# Patient Record
Sex: MALE | Race: WHITE | NOT HISPANIC OR LATINO | Employment: OTHER | URBAN - METROPOLITAN AREA
[De-identification: names, ages, dates, MRNs, and addresses within clinical notes are randomized per-mention and may not be internally consistent; named-entity substitution may affect disease eponyms.]

---

## 2020-03-05 ENCOUNTER — OFFICE VISIT (OUTPATIENT)
Dept: FAMILY MEDICINE | Facility: CLINIC | Age: 55
End: 2020-03-05
Payer: MEDICAID

## 2020-03-05 VITALS
BODY MASS INDEX: 24.34 KG/M2 | SYSTOLIC BLOOD PRESSURE: 138 MMHG | WEIGHT: 170 LBS | HEART RATE: 110 BPM | TEMPERATURE: 98 F | DIASTOLIC BLOOD PRESSURE: 98 MMHG | OXYGEN SATURATION: 97 % | HEIGHT: 70 IN

## 2020-03-05 DIAGNOSIS — Z00.00 WELLNESS EXAMINATION: ICD-10-CM

## 2020-03-05 DIAGNOSIS — I10 ESSENTIAL HYPERTENSION: Primary | ICD-10-CM

## 2020-03-05 DIAGNOSIS — Z72.0 TOBACCO ABUSE: ICD-10-CM

## 2020-03-05 DIAGNOSIS — Z23 NEED FOR TDAP VACCINATION: ICD-10-CM

## 2020-03-05 DIAGNOSIS — Z23 NEEDS FLU SHOT: ICD-10-CM

## 2020-03-05 DIAGNOSIS — Z86.39 HISTORY OF HYPERGLYCEMIA: ICD-10-CM

## 2020-03-05 PROCEDURE — 90472 IMMUNIZATION ADMIN EACH ADD: CPT | Mod: PBBFAC | Performed by: NURSE PRACTITIONER

## 2020-03-05 PROCEDURE — 99204 OFFICE O/P NEW MOD 45 MIN: CPT | Performed by: NURSE PRACTITIONER

## 2020-03-05 PROCEDURE — 90471 IMMUNIZATION ADMIN: CPT | Mod: PBBFAC | Performed by: NURSE PRACTITIONER

## 2020-03-05 PROCEDURE — 90715 TDAP VACCINE 7 YRS/> IM: CPT | Mod: PBBFAC | Performed by: NURSE PRACTITIONER

## 2020-03-05 PROCEDURE — 99204 PR OFFICE/OUTPT VISIT, NEW, LEVL IV, 45-59 MIN: ICD-10-PCS | Mod: S$PBB,,, | Performed by: NURSE PRACTITIONER

## 2020-03-05 PROCEDURE — 99204 OFFICE O/P NEW MOD 45 MIN: CPT | Mod: S$PBB,,, | Performed by: NURSE PRACTITIONER

## 2020-03-05 RX ORDER — LISINOPRIL 40 MG/1
40 TABLET ORAL DAILY
COMMUNITY
End: 2020-03-05 | Stop reason: SDUPTHER

## 2020-03-05 RX ORDER — LISINOPRIL 40 MG/1
40 TABLET ORAL DAILY
Qty: 90 TABLET | Refills: 1 | Status: SHIPPED | OUTPATIENT
Start: 2020-03-05 | End: 2020-08-13 | Stop reason: SDUPTHER

## 2020-03-05 NOTE — PATIENT INSTRUCTIONS
Eating Heart-Healthy Foods  Eating has a big impact on your heart health. In fact, eating healthier can improve several of your heart risks at once. For instance, it helps you manage weight, cholesterol, and blood pressure. Here are ideas to help you make heart-healthy changes without giving up all the foods and flavors you love.  Getting started  · Talk with your health care provider about eating plans, such as the DASH or Mediterranean diet. You may also be referred to a dietitian.  · Change a few things at a time. Give yourself time to get used to a few eating changes before adding more.  · Work to create a tasty, healthy eating plan that you can stick to for the rest of your life.    Goals for healthy eating  Below are some tips to improve your eating habits:  · Limit saturated fats and trans fats. Saturated fats raise your levels of cholesterol, so keep these fats to a minimum. They are found in foods such as fatty meats, whole milk, cheese, and palm and coconut oils. Avoid trans fats because they lower good cholesterol as well as raise bad cholesterol. Trans fats are most often found in processed foods.  · Reduce sodium (salt) intake. Eating too much salt may increase your blood pressure. Limit your sodium intake to 2,300 milligrams (mg) per day, or less if your health care provider recommends it. Dining out less often and eating fewer processed foods are two great ways to decrease the amount of salt you consume.  · Managing calories. A calorie is a unit of energy. Your body burns calories for fuel, but if you eat more calories than your body burns, the extras are stored as fat. Your health care provider can help you create a diet plan to manage your calories. This will likely include eating healthier foods as well as exercising regularly. To help you track your progress, keep a diary to record what you eat and how often you exercise.  Choose the right foods  Aim to make these foods staples of your diet. If  you have diabetes, you may have different recommendations than what is listed here:  · Fruits and vegetable provide plenty of nutrients without a lot of calories. At meals, fill half your plate with these foods. Split the other half of your plate between whole grains and lean protein.  · Whole grains are high in fiber and rich in vitamins and nutrients. Good choices include whole-wheat bread, pasta, and brown rice.  · Lean proteins give you nutrition with less fat. Good choices include fish, skinless chicken, and beans.  · Low-fat or nonfat dairy provides nutrients without a lot of fat. Try low-fat or nonfat milk, cheese, or yogurt.  · Healthy fats can be good for you in small amounts. These are unsaturated fats, such as olive oil, nuts, and fish. Try to have at least 2 servings per week of fatty fish such as salmon, sardines, mackerel, rainbow trout, and albacore tuna. These contain omega-3 fatty acids, which are good for your heart. Flaxseed is another source of a heart-healthy fat.  More on heart healthy eating    Read food labels  Healthy eating starts at the grocery store. Be sure to pay attention to food labels on packaged foods. Look for products that are high in fiber and protein, and low in saturated fat, cholesterol, and sodium. Avoid products that contain trans fat. And pay close attention to serving size. For instance, if you plan to eat two servings, double all the numbers on the label.  Prepare food right  A key part of healthy cooking is cutting down on added fat and salt. Look on the internet for lower-fat, lower-sodium recipes. Also, try these tips:  · Remove fat from meat and skin from poultry before cooking.  · Skim fat from the surface of soups and sauces.  · Broil, boil, bake, steam, grill, and microwave food without added fats.  · Choose ingredients that spice up your food without adding calories, fat, or sodium. Try these items: horseradish, hot sauce, lemon, mustard, nonfat salad dressings,  and vinegar. For salt-free herbs and spices, try basil, cilantro, cinnamon, pepper, and rosemary.  Date Last Reviewed: 6/25/2015 © 2000-2017 [employer]. [address] [place] [address], [place] [address]. All rights reserved. This information is not intended as a substitute for professional medical care. Always follow your healthcare professional's instructions.        Established High Blood Pressure    High blood pressure (hypertension) is a chronic disease. Often, healthcare providers dont know what causes it. But it can be caused by certain health conditions and medicines.  If you have high blood pressure, you may not have any symptoms. If you do have symptoms, they may include headache, dizziness, changes in your vision, chest pain, and shortness of breath. But even without symptoms, high blood pressure thats not treated raises your risk for heart attack and stroke. High blood pressure is a serious health risk and shouldnt be ignored.  A blood pressure reading is made up of two numbers: a higher number over a lower number. The top number is the systolic pressure. The bottom number is the diastolic pressure. A normal blood pressure is a systolic pressure of  less than 120 over a diastolic pressure of less than 80. You will see your blood pressure readings written together. For example, a person with a systolic pressure of 188 and a diastolic pressure of 78 will have 118/78 written in the medical record.  High blood pressure is when either the top number is 140 or higher, or the bottom number is 90 or higher. This must be the result when taking your blood pressure a number of times. The blood pressures between normal and high are called prehypertension.  Home care  If you have high blood pressure, you should do what is listed below to lower your blood pressure. If you are taking medicines for high blood pressure, these methods may reduce or end your need for medicines in the future.  · Begin a  weight-loss program if you are overweight.  · Cut back on how much salt you get in your diet. Heres how to do this:  ¨ Dont eat foods that have a lot of salt. These include olives, pickles, smoked meats, and salted potato chips.  ¨ Dont add salt to your food at the table.  ¨ Use only small amounts of salt when cooking.  · Start an exercise program. Talk with your healthcare provider about the type of exercise program that would be best for you. It doesn't have to be hard. Even brisk walking for 20 minutes 3 times a week is a good form of exercise.  · Dont take medicines that stimulate the heart. This includes many over-the-counter cold and sinus decongestant pills and sprays, as well as diet pills. Check the warnings about hypertension on the label. Before buying any over-the-counter medicines or supplements, always ask the pharmacist about the product's potential interaction with your high blood pressure and your high blood pressure medicines.  · Stimulants such as amphetamine or cocaine could be deadly for someone with high blood pressure. Never take these.  · Limit how much caffeine you get in your diet. Switch to caffeine-free products.  · Stop smoking. If you are a long-time smoker, this can be hard. Talk to your healthcare provider about medicines and nicotine replacement options to help you. Also, enroll in a stop-smoking program to make it more likely that you will quit for good.  · Learn how to handle stress. This is an important part of any program to lower blood pressure. Learn about relaxation methods like meditation, yoga, or biofeedback.  · If your provider prescribed medicines, take them exactly as directed. Missing doses may cause your blood pressure get out of control.  · If you miss a dose or doses, check with your healthcare provider or pharmacist about what to do.  · Consider buying an automatic blood pressure machine. Ask your provider for a recommendation. You can get one of these at most  pharmacies.     The American Heart Association recommends the following guidelines for home blood pressure monitoring:  · Don't smoke or drink coffee for 30 minutes before taking your blood pressure.  · Go to the bathroom before the test.  · Relax for 5 minutes before taking the measurement.  · Sit with your back supported (don't sit on a couch or soft chair); keep your feet on the floor uncrossed. Place your arm on a solid flat surface (like a table) with the upper part of the arm at heart level. Place the middle of the cuff directly above the eye of the elbow. Check the monitor's instruction manual for an illustration.  · Take multiple readings. When you measure, take 2 to 3 readings one minute apart and record all of the results.  · Take your blood pressure at the same time every day, or as your healthcare provider recommends.  · Record the date, time, and blood pressure reading.  · Take the record with you to your next medical appointment. If your blood pressure monitor has a built-in memory, simply take the monitor with you to your next appointment.  · Call your provider if you have several high readings. Don't be frightened by a single high blood pressure reading, but if you get several high readings, check in with your healthcare provider.  · Note: When blood pressure reaches a systolic (top number) of 180 or higher OR diastolic (bottom number) of 110 or higher, seek emergency medical treatment.  Follow-up care  You will need to see your healthcare provider regularly. This is to check your blood pressure and to make changes to your medicines. Make a follow-up appointment as directed. Bring the record of your home blood pressure readings to the appointment.  When to seek medical advice  Call your healthcare provider right away if any of these occur:  · Blood pressure reaches a systolic (upper number) of 180 or higher OR a diastolic (bottom number) of 110 or higher  · Chest pain or shortness of breath  · Severe  headache  · Throbbing or rushing sound in the ears  · Nosebleed  · Sudden severe pain in your belly (abdomen)  · Extreme drowsiness, confusion, or fainting  · Dizziness or spinning sensation (vertigo)  · Weakness of an arm or leg or one side of the face  · You have problems speaking or seeing   Date Last Reviewed: 12/1/2016  © 5325-8172 [employer] [address] [place] [address], [place] [address]. All rights reserved. This information is not intended as a substitute for professional medical care. Always follow your healthcare professional's instructions.

## 2020-03-05 NOTE — PROGRESS NOTES
SUBJECTIVE:    Patient ID: Wilmer Lange Jr. is a 54 y.o. male.    Chief Complaint: Establish Care and Hypertension    Mr Lange is a 54-year-old gentleman here to establish as a new patient.  He has a history significant for hypertension and diabetes, not on medication for diabetic management.  He has been out of his lisinopril for several days.  It has been over a year since his last lab work.    Denies any chest pain, palpitations or dyspnea.  Denies dyspepsia, cough, hemoptysis or melena.  He had cataract excision last year of 1 eye, was post to follow up for this other but did not.  He is a long-time tobacco user.      Past Medical History:   Diagnosis Date    Hypertension      History reviewed. No pertinent surgical history.  Family History   Problem Relation Age of Onset    Dementia Mother     Hypertension Mother     Diabetes Father     Hypertension Father     Aneurysm Father     No Known Problems Sister     No Known Problems Brother     No Known Problems Daughter     Retinal detachment Son     Blindness Son     No Known Problems Son     No Known Problems Son        Marital Status:   Alcohol History:  reports that he drinks about 21.0 standard drinks of alcohol per week.  Tobacco History:  reports that he has been smoking cigarettes. He has a 37.00 pack-year smoking history. He has never used smokeless tobacco.  Drug History:  reports that he does not use drugs.    Review of patient's allergies indicates:  No Known Allergies    Current Outpatient Medications:     lisinopril (PRINIVIL,ZESTRIL) 40 MG tablet, Take 1 tablet (40 mg total) by mouth once daily., Disp: 90 tablet, Rfl: 1    Review of Systems   Eyes: Positive for visual disturbance (with HTN episodes).   All other systems reviewed and are negative.         Objective:      Vitals:    03/05/20 1045 03/05/20 1145   BP: (!) 132/92 (!) 138/98   Pulse: 110    Temp: 98.2 °F (36.8 °C)    SpO2: 97%    Weight: 77.1 kg (170 lb)    Height:  "5' 10" (1.778 m)      Body mass index is 24.39 kg/m².  Physical Exam   Constitutional: He is oriented to person, place, and time. He appears well-developed and well-nourished.   HENT:   Head: Normocephalic.   Eyes: Pupils are equal, round, and reactive to light. Conjunctivae and EOM are normal.   Neck: Normal range of motion. Neck supple. No thyromegaly present.   Cardiovascular: Normal rate, regular rhythm and normal heart sounds.   Pulmonary/Chest: Effort normal and breath sounds normal.   Abdominal: Soft. Bowel sounds are normal.   Musculoskeletal: Normal range of motion. He exhibits no edema.   Lymphadenopathy:     He has no cervical adenopathy.   Neurological: He is alert and oriented to person, place, and time.   Skin: Skin is warm and dry. Capillary refill takes less than 2 seconds.   Psychiatric: He has a normal mood and affect.   Nursing note and vitals reviewed.        Assessment:       1. Essential hypertension    2. Tobacco abuse    3. Wellness examination    4. Need for Tdap vaccination    5. Needs flu shot         Plan:          Essential hypertension  -     Comprehensive metabolic panel; Future; Expected date: 03/05/2020  -     lisinopril (PRINIVIL,ZESTRIL) 40 MG tablet; Take 1 tablet (40 mg total) by mouth once daily.  Dispense: 90 tablet; Refill: 1    History of hyperglycemia  Check hemoglobin A1c.    Tobacco abuse  Discussed the benefits of tobacco cessation, he is not ready to quit at this time.      Wellness examination  -     CBC auto differential; Future; Expected date: 03/05/2020  -     Comprehensive metabolic panel; Future; Expected date: 03/05/2020  -     Lipid panel; Future; Expected date: 03/05/2020  -     TSH; Future; Expected date: 03/05/2020  -     Cologuard Screening (Multitarget Stool DNA); Future; Expected date: 03/05/2020  -     HIV 1/2 Ag/Ab (4th Gen); Future; Expected date: 03/05/2020  -     urinalysis; future; expected date:  03/05/2020  -     hemoglobin A1c; Future; Expected " date: 03/05/2020  -     Influenza - Quadrivalent (PF)  -     Tdap Vaccine         Need for Tdap vaccination  -     Tdap Vaccine    Needs flu shot  -     Influenza - Quadrivalent (PF)      Follow up in about 6 months (around 9/5/2020), or if symptoms worsen or fail to improve.

## 2020-08-13 DIAGNOSIS — I10 ESSENTIAL HYPERTENSION: ICD-10-CM

## 2020-08-13 RX ORDER — LISINOPRIL 40 MG/1
40 TABLET ORAL DAILY
Qty: 90 TABLET | Refills: 1 | Status: SHIPPED | OUTPATIENT
Start: 2020-08-13 | End: 2020-09-08 | Stop reason: SDUPTHER

## 2020-09-08 ENCOUNTER — OFFICE VISIT (OUTPATIENT)
Dept: FAMILY MEDICINE | Facility: CLINIC | Age: 55
End: 2020-09-08
Payer: MEDICAID

## 2020-09-08 VITALS
BODY MASS INDEX: 25.03 KG/M2 | TEMPERATURE: 98 F | DIASTOLIC BLOOD PRESSURE: 80 MMHG | OXYGEN SATURATION: 98 % | HEART RATE: 80 BPM | WEIGHT: 174.88 LBS | SYSTOLIC BLOOD PRESSURE: 131 MMHG | HEIGHT: 70 IN

## 2020-09-08 DIAGNOSIS — Z11.59 ENCOUNTER FOR HEPATITIS C SCREENING TEST FOR LOW RISK PATIENT: ICD-10-CM

## 2020-09-08 DIAGNOSIS — I10 ESSENTIAL HYPERTENSION: Primary | ICD-10-CM

## 2020-09-08 DIAGNOSIS — F17.210 HEAVY CIGARETTE SMOKER: ICD-10-CM

## 2020-09-08 PROCEDURE — 99214 OFFICE O/P EST MOD 30 MIN: CPT | Performed by: NURSE PRACTITIONER

## 2020-09-08 PROCEDURE — 99213 PR OFFICE/OUTPT VISIT, EST, LEVL III, 20-29 MIN: ICD-10-PCS | Mod: S$PBB,,, | Performed by: NURSE PRACTITIONER

## 2020-09-08 PROCEDURE — 99213 OFFICE O/P EST LOW 20 MIN: CPT | Mod: S$PBB,,, | Performed by: NURSE PRACTITIONER

## 2020-09-08 RX ORDER — LISINOPRIL 40 MG/1
40 TABLET ORAL DAILY
Qty: 90 TABLET | Refills: 1 | Status: SHIPPED | OUTPATIENT
Start: 2020-09-08 | End: 2021-04-01 | Stop reason: SDUPTHER

## 2020-09-08 NOTE — PATIENT INSTRUCTIONS
Potassium-Rich Foods  The normal adult diet usually contains 2,000 mg to 4,000 mg of potassium per day. More potassium is needed when you lose too much potassium from your body. This can happen if you have diarrhea or vomiting. It can also happen if you take a medicine to make you urinate more (diuretic). To increase the amount of potassium in your diet, include these high-potassium foods.     [The (*) indicates foods highest in potassium.]  Vegetables  Artichokes. Cooked 1/2 cup, 200 mg to 300 mg*  Asparagus. Cooked 1/2 cup, 200 mg to 300 mg  Beans. White, red, alvarez cooked 1/2 cup, 300 mg to 500 mg*  Beets. Cooked 1/2 cup, 200 mg to 300 mg  Broccoli. Cooked or raw 1 cup, 200 mg to 500 mg*  [place] sprouts. Cooked 1/2 cup, 200 mg to 300 mg  Cabbage. Raw 1 cup, 100 mg to 200 mg  Carrots. Raw or cooked 1/2 cup, 100 mg to 200 mg  Celery. Raw 1 cup, 200 mg to 300 mg  Lima beans. Fresh or frozen 1/2 cup, 300 mg to 500 mg*   Mushrooms. Raw or cooked 1/2 cup, 100 mg to 300 mg  Peas. Cooked 1/2 cup, 150 mg to 250 mg   Potatoes. Baked 1 medium, 500 mg to 900 mg*   Spinach. Cooked 1 cup, 800 mg to 900 mg*   Spinach. Raw 2 cups, 300 mg to 400 mg *  Squash, winter. Fresh, frozen, or cooked 1/2 cup, 200 mg to 400 mg   Tomato. Fresh 1 medium, 200 mg to 300 mg   Tomato juice. Canned 1/2 cup, 200 mg to 300 mg   Fruits  Apple juice. Unsweetened 1 cup, 200 mg to 300 mg   Apricots. Canned 1/2 cup, 200 mg to 300 mg   Apricots. Dried 4 pieces, 100 mg to 200 mg   Avocado. Raw 1/2 cup, 300 mg to 400 mg*  Banana. Fresh 1 small, 300 mg to 400 mg*   Cantaloupe. Fresh 1 cup diced, 300 mg to 400 mg*   Grape juice. Unsweetened 1 cup, 200 mg to 300 mg   Honeydew melon. Fresh 1 cup diced, 300 mg to 400 mg*   Orange. Fresh 1 medium, 200 mg to 300 mg    Orange juice. Unsweetened, fresh or frozen 1/2 cup, 200 mg to 300 mg  Pineapple juice. Unsweetened 1 cup, 300 mg to 400 mg   Prune juice. Unsweetened 1/2 cup, 300 mg to 400 mg*   Prunes. Dried 5  pieces, 300 mg to 400 mg*   Strawberries. Fresh or frozen 1 cup, 200 mg to 300 mg  Meat  Red meat. Cooked 3 ounces, 100 mg to 300 mg   Seafood  Cod, flounder, halibut. Cooked 3 ounces, 100 mg to 300 mg*  [place]. Cooked, 3 ounces 300 mg to 400 mg*   Scallops. Cooked 3 ounces, 200 mg to 300 mg*  Shrimp. Cooked 3/4 cup, 100 mg to 200 mg   Tuna. Fresh or canned 3/4 cup, 200 mg to 500 mg   Date Last Reviewed: 10/1/2016  © 0358-9237 "[employer]". [address] [place] [address], [place] [address]. All rights reserved. This information is not intended as a substitute for professional medical care. Always follow your healthcare professional's instructions.        Low-Salt Diet  This diet removes foods that are high in salt. It also limits the amount of salt you use when cooking. It is most often used for people with high blood pressure, edema (fluid retention), and kidney, liver, or heart disease.  Table salt contains the mineral sodium. Your body needs sodium to work normally. But too much sodium can make your health problems worse. Your healthcare provider is recommending a low-salt (also called low-sodium) diet for you. Your total daily allowance of salt is 1,500 to 2,300 milligrams (mg). It is less than 1 teaspoon of table salt. This means you can have only about 500 to 700 mg of sodium at each meal. People with certain health problems should limit salt intake to the lower end of the recommended range.    When you cook, dont add much salt. If you can cook without using salt, even better. Dont add salt to your food at the table.  When shopping, read food labels. Salt is often called sodium on the label. Choose foods that are salt-free, low salt, or very low salt. Note that foods with reduced salt may not lower your salt intake enough.    Beans, potatoes, and pasta  Ok: Dry beans, split peas, lentils, potatoes, rice, macaroni, pasta, spaghetti without added salt  Avoid: Potato chips, tortilla chips, and similar  products  Breads and cereals  Ok: Low-sodium breads, rolls, cereals, and cakes; low-salt crackers, matzo crackers  Avoid: Salted crackers, pretzels, popcorn, [place] toast, pancakes, muffins  Dairy  Ok: Milk, chocolate milk, hot chocolate mix, low-salt cheeses, and yogurt  Avoid: Processed cheese and cheese spreads; Roquefort, Camembert, and cottage cheese; buttermilk, instant breakfast drink  Desserts  Ok: Ice cream, frozen yogurt, juice bars, gelatin, cookies and pies, sugar, honey, jelly, hard candy  Avoid: Most pies, cakes and cookies prepared or processed with salt; instant pudding  Drinks  Ok: Tea, coffee, fizzy (carbonated) drinks, juices  Avoid: Flavored coffees, electrolyte replacement drinks, sports drinks  Meats  Ok: All fresh meat, fish, poultry, low-salt tuna, eggs, egg substitute  Avoid: Smoked, pickled, brine-cured, or salted meats and fish. This includes castro, chipped beef, corned beef, hot dogs, deli meats, ham, kosher meats, salt pork, sausage, canned tuna, salted codfish, smoked salmon, herring, sardines, or anchovies.  Seasonings and spices  Ok: Most seasonings are okay. Good substitutes for salt include: fresh herb blends, hot sauce, lemon, garlic, leon, vinegar, dry mustard, parsley, cilantro, horseradish, tomato paste, regular margarine, mayonnaise, unsalted butter, cream cheese, vegetable oil, cream, low-salt salad dressing and gravy.  Avoid: Regular ketchup, relishes, pickles, soy sauce, teriyaki sauce, Worcestershire sauce, BBQ sauce, tartar sauce, meat tenderizer, chili sauce, regular gravy, regular salad dressing, salted butter  Soups  Ok: Low-salt soups and broths made with allowed foods  Avoid: Bouillon cubes, soups with smoked or salted meats, regular soup and broth  Vegetables  Ok: Most vegetables are okay; also low-salt tomato and vegetable juices  Avoid: Sauerkraut and other brine-soaked vegetables; pickles and other pickled vegetables; tomato juice, olives  Date Last Reviewed:  8/1/2016  © 9349-1864 [employer]. [address] [place] [address], [place] [address]. All rights reserved. This information is not intended as a substitute for professional medical care. Always follow your healthcare professional's instructions.        Eating Heart-Healthy Foods  Eating has a big impact on your heart health. In fact, eating healthier can improve several of your heart risks at once. For instance, it helps you manage weight, cholesterol, and blood pressure. Here are ideas to help you make heart-healthy changes without giving up all the foods and flavors you love.  Getting started  · Talk with your health care provider about eating plans, such as the DASH or Mediterranean diet. You may also be referred to a dietitian.  · Change a few things at a time. Give yourself time to get used to a few eating changes before adding more.  · Work to create a tasty, healthy eating plan that you can stick to for the rest of your life.    Goals for healthy eating  Below are some tips to improve your eating habits:  · Limit saturated fats and trans fats. Saturated fats raise your levels of cholesterol, so keep these fats to a minimum. They are found in foods such as fatty meats, whole milk, cheese, and palm and coconut oils. Avoid trans fats because they lower good cholesterol as well as raise bad cholesterol. Trans fats are most often found in processed foods.  · Reduce sodium (salt) intake. Eating too much salt may increase your blood pressure. Limit your sodium intake to 2,300 milligrams (mg) per day, or less if your health care provider recommends it. Dining out less often and eating fewer processed foods are two great ways to decrease the amount of salt you consume.  · Managing calories. A calorie is a unit of energy. Your body burns calories for fuel, but if you eat more calories than your body burns, the extras are stored as fat. Your health care provider can help you create a diet plan to manage your  calories. This will likely include eating healthier foods as well as exercising regularly. To help you track your progress, keep a diary to record what you eat and how often you exercise.  Choose the right foods  Aim to make these foods staples of your diet. If you have diabetes, you may have different recommendations than what is listed here:  · Fruits and vegetable provide plenty of nutrients without a lot of calories. At meals, fill half your plate with these foods. Split the other half of your plate between whole grains and lean protein.  · Whole grains are high in fiber and rich in vitamins and nutrients. Good choices include whole-wheat bread, pasta, and brown rice.  · Lean proteins give you nutrition with less fat. Good choices include fish, skinless chicken, and beans.  · Low-fat or nonfat dairy provides nutrients without a lot of fat. Try low-fat or nonfat milk, cheese, or yogurt.  · Healthy fats can be good for you in small amounts. These are unsaturated fats, such as olive oil, nuts, and fish. Try to have at least 2 servings per week of fatty fish such as salmon, sardines, mackerel, rainbow trout, and albacore tuna. These contain omega-3 fatty acids, which are good for your heart. Flaxseed is another source of a heart-healthy fat.  More on heart healthy eating    Read food labels  Healthy eating starts at the grocery store. Be sure to pay attention to food labels on packaged foods. Look for products that are high in fiber and protein, and low in saturated fat, cholesterol, and sodium. Avoid products that contain trans fat. And pay close attention to serving size. For instance, if you plan to eat two servings, double all the numbers on the label.  Prepare food right  A key part of healthy cooking is cutting down on added fat and salt. Look on the internet for lower-fat, lower-sodium recipes. Also, try these tips:  · Remove fat from meat and skin from poultry before cooking.  · Skim fat from the surface of  soups and sauces.  · Broil, boil, bake, steam, grill, and microwave food without added fats.  · Choose ingredients that spice up your food without adding calories, fat, or sodium. Try these items: horseradish, hot sauce, lemon, mustard, nonfat salad dressings, and vinegar. For salt-free herbs and spices, try basil, cilantro, cinnamon, pepper, and rosemary.  Date Last Reviewed: 6/25/2015  © 7481-8336 [employer]. [address] [place] [address]. All rights reserved. This information is not intended as a substitute for professional medical care. Always follow your healthcare professional's instructions.      How to Quit Smoking  Smoking is one of the hardest habits to break. About half of all people who have ever smoked have been able to quit. Most people who still smoke want to quit. Here are some of the best ways to stop smoking.    Keep trying  Most smokers make many attempts at quitting before they are successful. Its important not to give up.  Go cold turkey  Most former smokers quit cold turkey (all at once). Trying to cut back gradually doesn't seem to work as well, perhaps because it continues the smoking habit. Also, it is possible to inhale more while smoking fewer cigarettes. This results in the same amount of nicotine in your body.  Get support  Support programs can be a big help, especially for heavy smokers. These groups offer lectures, ways to change behavior, and peer support. Here are some ways to find a support program:  · Free national quitline: 800QUIT-NOW (470-460-6300).  · Hospital quit-smoking programs.  · American Lung Association: (145.111.6260).  · American Cancer Society (366-308-1143).  Support at home is important too. Nonsmokers can offer praise and encouragement. If the smoker in your life finds it hard to quit, encourage them to keep trying.  Over-the-counter medicines  Nicotine replacement therapy may make quitting easier. Certain aids, such as the nicotine  "patch, gum, and lozenges, are available without a prescription. It is best to use these under a doctors care, though. The skin patch provides a steady supply of nicotine. Nicotine gum and lozenges give temporary bursts of low levels of nicotine. Both methods reduce the craving for cigarettes. Warning: If you have nausea, vomiting, dizziness, weakness, or a fast heartbeat, stop using these products and see your doctor.  Prescription medicines  After reviewing your smoking patterns and past attempts to quit, your doctor may offer a prescription medicine such as bupropion, varenicline, a nicotine inhaler, or nasal spray. Each has advantages and side effects. Your doctor can review these with you.  Health benefits of quitting  The benefits of quitting start right away and keep improving the longer you go without smoking. These benefits occur at any age.  So whether you are 17 or 70, quitting is a good decision. Some of the benefits include:  · 20 minutes: Blood pressure and pulse return to normal.  · 8 hours: Oxygen levels return to normal.  · 2 days: Ability to smell and taste begin to improve as damaged nerves regrow.  · 2 to 3 weeks: Circulation and lung function improve.  · 1 to 9 months: Coughing, congestion, and shortness of breath decrease; tiredness decreases.  · 1 year: Risk of heart attack decreases by half.  · 5 years: Risk of lung cancer decreases by half; risk of stroke becomes the same as a nonsmokers.  For more on how to quit smoking, try these online resources:   · Smokefree.gov  · "Clearing the Air" booklet from the National [employer] [place]: smokefree.gov/sites/default/files/pdf/clearing-the-air-accessible.pdf  Date Last Reviewed: 3/1/2017  © 6172-5217 [employer]. [address] [place] [address], [place] [address]. All rights reserved. This information is not intended as a substitute for professional medical care. Always follow your healthcare professional's instructions.        "

## 2020-09-08 NOTE — PROGRESS NOTES
SUBJECTIVE:    Patient ID: Wilmer Lange Jr is a 55 y.o. male.    Chief Complaint: Follow-up (6 month f/u HTN)    Mr Lange is a 54yo gentleman here for routine follow-up on hypertension and medication refill.  He has been feeling well.  Denies any chest pain, palpitations or dyspnea.  Denies dyspepsia, cough, hemoptysis or melena.    He did not have previously ordered lab work completed due to COVID-19 pandemic restrictions.  He also has not yet completed Cologuard screening.  Declines pneumonia or flu vaccine today.    Continues on tobacco.      Past Medical History:   Diagnosis Date    Hypertension      History reviewed. No pertinent surgical history.  Family History   Problem Relation Age of Onset    Dementia Mother     Hypertension Mother     Diabetes Father     Hypertension Father     Aneurysm Father     No Known Problems Sister     No Known Problems Brother     No Known Problems Daughter     Retinal detachment Son     Blindness Son     No Known Problems Son     No Known Problems Son        Marital Status:   Alcohol History:  reports current alcohol use of about 21.0 standard drinks of alcohol per week.  Tobacco History:  reports that he has been smoking cigarettes. He has a 37.00 pack-year smoking history. He has never used smokeless tobacco.  Drug History:  reports no history of drug use.    Review of patient's allergies indicates:  No Known Allergies    Current Outpatient Medications:     lisinopriL (PRINIVIL,ZESTRIL) 40 MG tablet, Take 1 tablet (40 mg total) by mouth once daily., Disp: 90 tablet, Rfl: 1    Review of Systems   Constitutional: Negative for activity change, appetite change, chills, fatigue and fever.   HENT: Negative for congestion, ear pain, rhinorrhea and sore throat.    Eyes: Negative for redness and itching.   Respiratory: Negative for cough and shortness of breath.    Cardiovascular: Negative for chest pain.   Gastrointestinal: Negative for abdominal pain,  "constipation, diarrhea and nausea.   Genitourinary: Negative for difficulty urinating and frequency.   Musculoskeletal: Negative for myalgias.   Neurological: Negative for dizziness and headaches.   Psychiatric/Behavioral: Negative for agitation, behavioral problems, sleep disturbance and suicidal ideas. The patient is not nervous/anxious.    All other systems reviewed and are negative.         Objective:      Vitals:    09/08/20 1102 09/08/20 1115   BP: 131/80    Pulse: 102 80   Temp: 98.2 °F (36.8 °C)    TempSrc: Temporal    SpO2: 98%    Weight: 79.3 kg (174 lb 14.4 oz)    Height: 5' 10" (1.778 m)      Body mass index is 25.1 kg/m².  Physical Exam  Vitals signs and nursing note reviewed.   Constitutional:       Appearance: Normal appearance. He is well-developed and normal weight.   HENT:      Head: Normocephalic.   Eyes:      Extraocular Movements: Extraocular movements intact.      Conjunctiva/sclera: Conjunctivae normal.      Pupils: Pupils are equal, round, and reactive to light.   Neck:      Musculoskeletal: Normal range of motion and neck supple.      Thyroid: No thyromegaly.      Vascular: No carotid bruit.   Cardiovascular:      Rate and Rhythm: Regular rhythm. Tachycardia present.      Pulses: Normal pulses.      Heart sounds: Normal heart sounds.   Pulmonary:      Effort: Pulmonary effort is normal.      Breath sounds: Normal breath sounds.   Abdominal:      General: Bowel sounds are normal.      Palpations: Abdomen is soft.      Tenderness: There is no abdominal tenderness.   Musculoskeletal: Normal range of motion.      Right lower leg: No edema.      Left lower leg: No edema.   Skin:     General: Skin is warm and dry.      Capillary Refill: Capillary refill takes less than 2 seconds.   Neurological:      Mental Status: He is alert and oriented to person, place, and time.   Psychiatric:         Thought Content: Thought content normal.           Assessment:       1. Essential hypertension    2. Heavy " cigarette smoker    3. Encounter for hepatitis C screening test for low risk patient         Plan:       Essential hypertension  -     lisinopriL (PRINIVIL,ZESTRIL) 40 MG tablet; Take 1 tablet (40 mg total) by mouth once daily.  Dispense: 90 tablet; Refill: 1    Heavy cigarette smoker  Greater than 10 min with face-to-face discussion was completed including treatment options.  Reviewed risks of continued tobacco use and benefits of cessation.  He verbalizes understanding and will consider options.    Encounter for hepatitis C screening test for low risk patient  -     Hepatitis C Antibody; Future; Expected date: 09/08/2020    Advised to complete your labs as previously ordered.    Follow up in about 6 months (around 3/8/2021), or if symptoms worsen or fail to improve.

## 2021-04-01 ENCOUNTER — OFFICE VISIT (OUTPATIENT)
Dept: FAMILY MEDICINE | Facility: CLINIC | Age: 56
End: 2021-04-01
Payer: MEDICAID

## 2021-04-01 VITALS
SYSTOLIC BLOOD PRESSURE: 128 MMHG | HEIGHT: 70 IN | DIASTOLIC BLOOD PRESSURE: 80 MMHG | WEIGHT: 186 LBS | TEMPERATURE: 99 F | BODY MASS INDEX: 26.63 KG/M2 | OXYGEN SATURATION: 96 % | HEART RATE: 84 BPM

## 2021-04-01 DIAGNOSIS — R53.83 FATIGUE, UNSPECIFIED TYPE: ICD-10-CM

## 2021-04-01 DIAGNOSIS — I10 ESSENTIAL HYPERTENSION: Primary | ICD-10-CM

## 2021-04-01 DIAGNOSIS — Z83.438 FAMILY HISTORY OF ELEVATED BLOOD LIPIDS: ICD-10-CM

## 2021-04-01 DIAGNOSIS — F17.210 HEAVY CIGARETTE SMOKER: ICD-10-CM

## 2021-04-01 DIAGNOSIS — Z11.4 SCREENING FOR HIV (HUMAN IMMUNODEFICIENCY VIRUS): ICD-10-CM

## 2021-04-01 DIAGNOSIS — Z86.39 HISTORY OF HYPERGLYCEMIA: ICD-10-CM

## 2021-04-01 DIAGNOSIS — Z12.11 ENCOUNTER FOR SCREENING COLONOSCOPY: ICD-10-CM

## 2021-04-01 DIAGNOSIS — Z12.5 SCREENING PSA (PROSTATE SPECIFIC ANTIGEN): ICD-10-CM

## 2021-04-01 DIAGNOSIS — Z11.59 ENCOUNTER FOR HEPATITIS C SCREENING TEST FOR LOW RISK PATIENT: ICD-10-CM

## 2021-04-01 PROCEDURE — 99214 OFFICE O/P EST MOD 30 MIN: CPT | Mod: S$PBB,,, | Performed by: NURSE PRACTITIONER

## 2021-04-01 PROCEDURE — 99214 PR OFFICE/OUTPT VISIT, EST, LEVL IV, 30-39 MIN: ICD-10-PCS | Mod: S$PBB,,, | Performed by: NURSE PRACTITIONER

## 2021-04-01 PROCEDURE — 99215 OFFICE O/P EST HI 40 MIN: CPT | Performed by: NURSE PRACTITIONER

## 2021-04-01 RX ORDER — LISINOPRIL 40 MG/1
40 TABLET ORAL DAILY
Qty: 90 TABLET | Refills: 1 | Status: SHIPPED | OUTPATIENT
Start: 2021-04-01 | End: 2022-06-02 | Stop reason: SDUPTHER

## 2021-05-07 ENCOUNTER — OFFICE VISIT (OUTPATIENT)
Dept: FAMILY MEDICINE | Facility: CLINIC | Age: 56
End: 2021-05-07
Payer: MEDICAID

## 2021-05-07 VITALS
HEART RATE: 110 BPM | DIASTOLIC BLOOD PRESSURE: 84 MMHG | SYSTOLIC BLOOD PRESSURE: 114 MMHG | BODY MASS INDEX: 26.77 KG/M2 | HEIGHT: 70 IN | OXYGEN SATURATION: 98 % | WEIGHT: 187 LBS

## 2021-05-07 DIAGNOSIS — H60.502 ACUTE OTITIS EXTERNA OF LEFT EAR, UNSPECIFIED TYPE: ICD-10-CM

## 2021-05-07 DIAGNOSIS — H61.21 HEARING LOSS OF RIGHT EAR DUE TO CERUMEN IMPACTION: ICD-10-CM

## 2021-05-07 DIAGNOSIS — F17.210 HEAVY CIGARETTE SMOKER: ICD-10-CM

## 2021-05-07 DIAGNOSIS — R00.0 TACHYCARDIA: Primary | ICD-10-CM

## 2021-05-07 LAB
EKG 12-LEAD: NORMAL
PR INTERVAL: NORMAL
PRT AXES: NORMAL
QRS DURATION: NORMAL
QT/QTC: NORMAL
VENTRICULAR RATE: NORMAL

## 2021-05-07 PROCEDURE — 99214 OFFICE O/P EST MOD 30 MIN: CPT | Performed by: NURSE PRACTITIONER

## 2021-05-07 PROCEDURE — 93010 ELECTROCARDIOGRAM REPORT: CPT | Mod: S$PBB,,, | Performed by: NURSE PRACTITIONER

## 2021-05-07 PROCEDURE — 99214 OFFICE O/P EST MOD 30 MIN: CPT | Mod: 25,S$PBB,, | Performed by: NURSE PRACTITIONER

## 2021-05-07 PROCEDURE — 99214 PR OFFICE/OUTPT VISIT, EST, LEVL IV, 30-39 MIN: ICD-10-PCS | Mod: 25,S$PBB,, | Performed by: NURSE PRACTITIONER

## 2021-05-07 PROCEDURE — 93005 ELECTROCARDIOGRAM TRACING: CPT | Mod: PBBFAC,59 | Performed by: NURSE PRACTITIONER

## 2021-05-07 PROCEDURE — 93010 POCT EKG 12-LEAD: ICD-10-PCS | Mod: S$PBB,,, | Performed by: NURSE PRACTITIONER

## 2021-05-07 PROCEDURE — 69209 EAR CERUMEN REMOVAL: ICD-10-PCS | Mod: 59,S$PBB,RT, | Performed by: NURSE PRACTITIONER

## 2021-05-07 PROCEDURE — 69209 REMOVE IMPACTED EAR WAX UNI: CPT | Mod: PBBFAC | Performed by: NURSE PRACTITIONER

## 2021-05-07 RX ORDER — CIPROFLOXACIN AND DEXAMETHASONE 3; 1 MG/ML; MG/ML
4 SUSPENSION/ DROPS AURICULAR (OTIC) 2 TIMES DAILY
Qty: 5 ML | Refills: 0 | Status: SHIPPED | OUTPATIENT
Start: 2021-05-07 | End: 2022-12-05

## 2021-05-07 RX ORDER — METOPROLOL SUCCINATE 25 MG/1
25 TABLET, EXTENDED RELEASE ORAL DAILY
Qty: 30 TABLET | Refills: 3 | Status: SHIPPED | OUTPATIENT
Start: 2021-05-07 | End: 2022-06-02 | Stop reason: SDUPTHER

## 2022-06-02 ENCOUNTER — HOSPITAL ENCOUNTER (OUTPATIENT)
Dept: RADIOLOGY | Facility: HOSPITAL | Age: 57
Discharge: HOME | End: 2022-06-02
Attending: NURSE PRACTITIONER
Payer: MEDICAID

## 2022-06-02 ENCOUNTER — OFFICE VISIT (OUTPATIENT)
Dept: FAMILY MEDICINE | Facility: CLINIC | Age: 57
End: 2022-06-02
Payer: MEDICAID

## 2022-06-02 VITALS
BODY MASS INDEX: 26.1 KG/M2 | WEIGHT: 182.31 LBS | TEMPERATURE: 99 F | OXYGEN SATURATION: 95 % | DIASTOLIC BLOOD PRESSURE: 80 MMHG | HEIGHT: 70 IN | SYSTOLIC BLOOD PRESSURE: 112 MMHG | HEART RATE: 106 BPM

## 2022-06-02 DIAGNOSIS — M25.512 CHRONIC LEFT SHOULDER PAIN: ICD-10-CM

## 2022-06-02 DIAGNOSIS — R00.0 TACHYCARDIA: ICD-10-CM

## 2022-06-02 DIAGNOSIS — G89.29 CHRONIC LEFT SHOULDER PAIN: ICD-10-CM

## 2022-06-02 DIAGNOSIS — Z12.5 SCREENING FOR PROSTATE CANCER: ICD-10-CM

## 2022-06-02 DIAGNOSIS — Z23 NEED FOR PNEUMOCOCCAL VACCINATION: ICD-10-CM

## 2022-06-02 DIAGNOSIS — Z11.59 NEED FOR HEPATITIS C SCREENING TEST: ICD-10-CM

## 2022-06-02 DIAGNOSIS — Z11.4 SCREENING FOR HIV (HUMAN IMMUNODEFICIENCY VIRUS): ICD-10-CM

## 2022-06-02 DIAGNOSIS — F17.210 HEAVY CIGARETTE SMOKER: ICD-10-CM

## 2022-06-02 DIAGNOSIS — Z12.11 SCREENING FOR COLON CANCER: ICD-10-CM

## 2022-06-02 DIAGNOSIS — I10 ESSENTIAL HYPERTENSION: Primary | ICD-10-CM

## 2022-06-02 PROCEDURE — 99214 OFFICE O/P EST MOD 30 MIN: CPT | Performed by: NURSE PRACTITIONER

## 2022-06-02 PROCEDURE — 3008F PR BODY MASS INDEX (BMI) DOCUMENTED: ICD-10-PCS | Mod: CPTII,,, | Performed by: NURSE PRACTITIONER

## 2022-06-02 PROCEDURE — 99214 OFFICE O/P EST MOD 30 MIN: CPT | Mod: S$PBB,,, | Performed by: NURSE PRACTITIONER

## 2022-06-02 PROCEDURE — 4010F ACE/ARB THERAPY RXD/TAKEN: CPT | Mod: CPTII,,, | Performed by: NURSE PRACTITIONER

## 2022-06-02 PROCEDURE — 90677 PCV20 VACCINE IM: CPT | Mod: PBBFAC | Performed by: NURSE PRACTITIONER

## 2022-06-02 PROCEDURE — 3079F PR MOST RECENT DIASTOLIC BLOOD PRESSURE 80-89 MM HG: ICD-10-PCS | Mod: CPTII,,, | Performed by: NURSE PRACTITIONER

## 2022-06-02 PROCEDURE — 3074F SYST BP LT 130 MM HG: CPT | Mod: CPTII,,, | Performed by: NURSE PRACTITIONER

## 2022-06-02 PROCEDURE — 99214 PR OFFICE/OUTPT VISIT, EST, LEVL IV, 30-39 MIN: ICD-10-PCS | Mod: S$PBB,,, | Performed by: NURSE PRACTITIONER

## 2022-06-02 PROCEDURE — 4010F PR ACE/ARB THEARPY RXD/TAKEN: ICD-10-PCS | Mod: CPTII,,, | Performed by: NURSE PRACTITIONER

## 2022-06-02 PROCEDURE — 3074F PR MOST RECENT SYSTOLIC BLOOD PRESSURE < 130 MM HG: ICD-10-PCS | Mod: CPTII,,, | Performed by: NURSE PRACTITIONER

## 2022-06-02 PROCEDURE — 73030 X-RAY EXAM OF SHOULDER: CPT | Mod: TC,PO,LT

## 2022-06-02 PROCEDURE — 3079F DIAST BP 80-89 MM HG: CPT | Mod: CPTII,,, | Performed by: NURSE PRACTITIONER

## 2022-06-02 PROCEDURE — 3008F BODY MASS INDEX DOCD: CPT | Mod: CPTII,,, | Performed by: NURSE PRACTITIONER

## 2022-06-02 RX ORDER — LISINOPRIL 40 MG/1
40 TABLET ORAL DAILY
Qty: 90 TABLET | Refills: 1 | Status: SHIPPED | OUTPATIENT
Start: 2022-06-02 | End: 2022-12-05 | Stop reason: SDUPTHER

## 2022-06-02 RX ORDER — METOPROLOL SUCCINATE 25 MG/1
25 TABLET, EXTENDED RELEASE ORAL DAILY
Qty: 90 TABLET | Refills: 1 | Status: SHIPPED | OUTPATIENT
Start: 2022-06-02 | End: 2022-12-05 | Stop reason: SDUPTHER

## 2022-06-02 NOTE — PROGRESS NOTES
SUBJECTIVE:      Patient ID: Wilmer Lange Jr is a 56 y.o. male.    Chief Complaint: Hypertension (F/u med refill)    56-year-old male presents to the clinic for hypertension follow-up.  He is a former patient [facility].  Past medical history significant for hypertension and tachycardia.  Currently takes lisinopril for his blood pressure.  He does not take the metoprolol, because he did not know it was for tachycardia.  Heart rate is slightly evaluated today and has been elevated at previously office visits.  He needs refills of both medications.     He was in detention x1 year and was released 2 days ago. While he was in detention he stopped smoking.  He admits to smoking the past 2 day after being released. Encouraged smoking cessation since he was able to stop.    He reports chronic left shoulder pain. Pain is the the anterior/ lateral shoulder, describes as sharp and constant.  He is unable to sleep on his left side.  Pain is worse with overhead motions or activity.  He has been taking ibuprofen with minimal relief. Denies injury or trauma.     Overdue healthcare maintenance reviewed with patient.       Family History   Problem Relation Age of Onset    Dementia Mother     Hypertension Mother     Diabetes Father     Hypertension Father     Aneurysm Father     No Known Problems Sister     No Known Problems Brother     No Known Problems Daughter     Retinal detachment Son     Blindness Son     No Known Problems Son     No Known Problems Son       Social History     Socioeconomic History    Marital status:     Number of children: 3   Occupational History    Occupation: electrical & yard work   Tobacco Use    Smoking status: Current Every Day Smoker     Packs/day: 1.00     Years: 37.00     Pack years: 37.00     Types: Cigarettes    Smokeless tobacco: Never Used   Substance and Sexual Activity    Alcohol use: Yes     Alcohol/week: 21.0 standard drinks     Types: 21 Cans of beer per  week    Drug use: Never    Sexual activity: Yes     Partners: Female     Birth control/protection: None     Current Outpatient Medications   Medication Sig Dispense Refill    ciprofloxacin-dexamethasone 0.3-0.1% (CIPRODEX) 0.3-0.1 % DrpS Place 4 drops into both ears 2 (two) times daily. (Patient not taking: Reported on 6/2/2022) 5 mL 0    lisinopriL (PRINIVIL,ZESTRIL) 40 MG tablet Take 1 tablet (40 mg total) by mouth once daily. 90 tablet 1    metoprolol succinate (TOPROL-XL) 25 MG 24 hr tablet Take 1 tablet (25 mg total) by mouth once daily. 90 tablet 1     No current facility-administered medications for this visit.     Review of patient's allergies indicates:  No Known Allergies   Past Medical History:   Diagnosis Date    Hypertension      History reviewed. No pertinent surgical history.    Review of Systems   Constitutional: Negative for activity change, appetite change, chills, diaphoresis, fatigue, fever and unexpected weight change.   HENT: Negative for congestion, ear pain, sinus pressure, sore throat, trouble swallowing and voice change.    Eyes: Negative for pain, discharge and visual disturbance.   Respiratory: Negative for cough, chest tightness, shortness of breath and wheezing.    Cardiovascular: Negative for chest pain and palpitations.        Tachycardia and hypertension   Gastrointestinal: Negative for abdominal pain, constipation, diarrhea, nausea and vomiting.   Genitourinary: Negative for difficulty urinating, flank pain, frequency and urgency.   Musculoskeletal: Positive for arthralgias. Negative for back pain, joint swelling and neck pain.        Left shoulder pain   Skin: Negative for color change and rash.   Neurological: Negative for dizziness, seizures, syncope, weakness, numbness and headaches.   Hematological: Negative for adenopathy.   Psychiatric/Behavioral: Negative for dysphoric mood and sleep disturbance. The patient is not nervous/anxious.       OBJECTIVE:      Vitals:     "06/02/22 1502   BP: 112/80   BP Location: Left arm   Patient Position: Sitting   BP Method: Medium (Manual)   Pulse: 106   Temp: 99 °F (37.2 °C)   TempSrc: Oral   SpO2: 95%   Weight: 82.7 kg (182 lb 4.8 oz)   Height: 5' 10" (1.778 m)     Physical Exam  Vitals and nursing note reviewed.   Constitutional:       General: He is awake. He is not in acute distress.     Appearance: Normal appearance. He is overweight. He is not ill-appearing, toxic-appearing or diaphoretic.   HENT:      Head: Normocephalic and atraumatic.      Right Ear: Tympanic membrane, ear canal and external ear normal. There is no impacted cerumen.      Left Ear: Tympanic membrane, ear canal and external ear normal. There is no impacted cerumen.      Nose: Nose normal. No congestion or rhinorrhea.      Mouth/Throat:      Mouth: Mucous membranes are moist.      Pharynx: No oropharyngeal exudate or posterior oropharyngeal erythema.   Eyes:      General: Lids are normal. Gaze aligned appropriately. No scleral icterus.     Extraocular Movements: Extraocular movements intact.      Conjunctiva/sclera: Conjunctivae normal.      Right eye: Right conjunctiva is not injected.      Left eye: Left conjunctiva is not injected.      Pupils: Pupils are equal, round, and reactive to light.   Neck:      Thyroid: No thyromegaly.      Vascular: No carotid bruit.   Cardiovascular:      Rate and Rhythm: Regular rhythm. Tachycardia present.      Pulses: Normal pulses.      Heart sounds: Normal heart sounds, S1 normal and S2 normal. No murmur heard.    No friction rub. No gallop.   Pulmonary:      Effort: Pulmonary effort is normal. No respiratory distress.      Breath sounds: Normal breath sounds. No stridor. No decreased breath sounds, wheezing, rhonchi or rales.   Chest:      Chest wall: No tenderness.   Abdominal:      General: Bowel sounds are normal. There is no distension.      Palpations: Abdomen is soft.      Tenderness: There is no abdominal tenderness. There is " no guarding.   Musculoskeletal:      Left shoulder: Tenderness present. No crepitus. Decreased range of motion. Normal strength.        Arms:       Cervical back: Full passive range of motion without pain, normal range of motion and neck supple. No muscular tenderness.      Right lower leg: No edema.      Left lower leg: No edema.      Comments: Decrease range of motion to shoulder due to pain.  Empty can test positive.  Increased pain with Mcmullen Jaydon maneuver.  Can abduct left shoulder to approximately 90 °.  There is no erythema, swelling, or crepitus.   Lymphadenopathy:      Cervical: No cervical adenopathy.   Skin:     General: Skin is warm and dry.      Capillary Refill: Capillary refill takes less than 2 seconds.      Findings: No erythema or rash.   Neurological:      Mental Status: He is alert and oriented to person, place, and time. Mental status is at baseline.   Psychiatric:         Attention and Perception: Attention normal.         Mood and Affect: Mood normal.         Speech: Speech normal.         Behavior: Behavior normal. Behavior is cooperative.         Thought Content: Thought content normal.         Judgment: Judgment normal.        Assessment:       1. Essential hypertension    2. Chronic left shoulder pain    3. Tachycardia    4. Heavy cigarette smoker    5. Screening for HIV (human immunodeficiency virus)    6. Need for hepatitis C screening test    7. Screening for prostate cancer    8. Screening for colon cancer    9. Need for pneumococcal vaccination        Plan:       Essential hypertension  Stable, continue current treatment.  Low-sodium diet.  Stop smoking.  -     lisinopriL (PRINIVIL,ZESTRIL) 40 MG tablet; Take 1 tablet (40 mg total) by mouth once daily.  Dispense: 90 tablet; Refill: 1  -     metoprolol succinate (TOPROL-XL) 25 MG 24 hr tablet; Take 1 tablet (25 mg total) by mouth once daily.  Dispense: 90 tablet; Refill: 1  -     TSH; Future; Expected date: 06/02/2022  -      Microalbumin/Creatinine Ratio, Urine; Future; Expected date: 06/02/2022  -     Urinalysis; Future; Expected date: 06/02/2022  -     Lipid Panel; Future; Expected date: 06/02/2022  -     Comprehensive Metabolic Panel; Future; Expected date: 06/02/2022    Chronic left shoulder pain  Suspect impingement syndrome.  Will get x-rays.  Offered Ortho referral at this time, but patient declined.  -     X-Ray Shoulder 2 or More Views Left; Future; Expected date: 06/02/2022    Tachycardia  Heart rate slightly elevated today, encouraged patient to restart metoprolol.   -     metoprolol succinate (TOPROL-XL) 25 MG 24 hr tablet; Take 1 tablet (25 mg total) by mouth once daily.  Dispense: 90 tablet; Refill: 1  -     TSH; Future; Expected date: 06/02/2022    Heavy cigarette smoker  Patient only recently restarted smoking 2 days ago.  Patient has smoked approximately 2 packs of cigarettes in the past 2 days.  Smoking cessation was strongly encouraged since he was able to stop x1 year.    Screening for HIV (human immunodeficiency virus)  -     HIV 1/2 Ag/Ab (4th Gen); Future; Expected date: 06/02/2022    Need for hepatitis C screening test  -     Hepatitis C Antibody; Future; Expected date: 06/02/2022    Screening for prostate cancer  -     PSA, Screening; Future; Expected date: 06/02/2022    Screening for colon cancer  Multiple screening options were discussed.  Patient chose to use the Cologuard as his screening tool.  Patient informed if his Cologuard is positive he will need a diagnostic colonoscopy, which could have more out of pocket costs.  Patient verbalized understanding.  -     Cologuard Screening (Multitarget Stool DNA); Future; Expected date: 06/02/2022    Need for pneumococcal vaccination  -     (In Office Administered) Pneumococcal Conjugate Vaccine (20 Valent) (IM)    This note was created using [employer] voice recognition software that occasionally misinterprets phrases or words.     Follow up in about 6 months (around  12/2/2022) for HTN.      6/2/2022 RONNIE Morgan, FNP

## 2022-06-03 ENCOUNTER — TELEPHONE (OUTPATIENT)
Dept: FAMILY MEDICINE | Facility: CLINIC | Age: 57
End: 2022-06-03

## 2022-06-03 NOTE — TELEPHONE ENCOUNTER
----- Message from Richard Gallardo NP sent at 6/2/2022  4:51 PM CDT -----  Minor left AC joint osteoarthrosis is present, otherwise no acute left shoulder abnormality.  See if patient would like to see ortho.

## 2022-12-01 ENCOUNTER — LAB VISIT (OUTPATIENT)
Dept: LAB | Facility: HOSPITAL | Age: 57
End: 2022-12-01
Attending: NURSE PRACTITIONER
Payer: MEDICAID

## 2022-12-01 DIAGNOSIS — I10 ESSENTIAL HYPERTENSION: ICD-10-CM

## 2022-12-01 DIAGNOSIS — Z12.5 SCREENING FOR PROSTATE CANCER: ICD-10-CM

## 2022-12-01 DIAGNOSIS — Z11.4 SCREENING FOR HIV (HUMAN IMMUNODEFICIENCY VIRUS): ICD-10-CM

## 2022-12-01 DIAGNOSIS — R00.0 TACHYCARDIA: ICD-10-CM

## 2022-12-01 DIAGNOSIS — Z11.59 NEED FOR HEPATITIS C SCREENING TEST: ICD-10-CM

## 2022-12-01 LAB
ALBUMIN SERPL BCP-MCNC: 4.3 G/DL (ref 3.5–5.2)
ALBUMIN/CREAT UR: 98.5 UG/MG (ref 0–30)
ALP SERPL-CCNC: 62 U/L (ref 55–135)
ALT SERPL W/O P-5'-P-CCNC: 30 U/L (ref 10–44)
ANION GAP SERPL CALC-SCNC: 10 MMOL/L (ref 8–16)
AST SERPL-CCNC: 26 U/L (ref 10–40)
BILIRUB SERPL-MCNC: 0.5 MG/DL (ref 0.1–1)
BILIRUB UR QL STRIP: NEGATIVE
BUN SERPL-MCNC: 9 MG/DL (ref 6–20)
CALCIUM SERPL-MCNC: 8.7 MG/DL (ref 8.7–10.5)
CHLORIDE SERPL-SCNC: 103 MMOL/L (ref 95–110)
CHOLEST SERPL-MCNC: 180 MG/DL (ref 120–199)
CHOLEST/HDLC SERPL: 3.2 {RATIO} (ref 2–5)
CLARITY UR: CLEAR
CO2 SERPL-SCNC: 23 MMOL/L (ref 23–29)
COLOR UR: YELLOW
COMPLEXED PSA SERPL-MCNC: 1.2 NG/ML (ref 0–4)
CREAT SERPL-MCNC: 0.7 MG/DL (ref 0.5–1.4)
CREAT UR-MCNC: 13 MG/DL (ref 23–375)
EST. GFR  (NO RACE VARIABLE): >60 ML/MIN/1.73 M^2
GLUCOSE SERPL-MCNC: 108 MG/DL (ref 70–110)
GLUCOSE UR QL STRIP: NEGATIVE
HDLC SERPL-MCNC: 56 MG/DL (ref 40–75)
HDLC SERPL: 31.1 % (ref 20–50)
HGB UR QL STRIP: NEGATIVE
KETONES UR QL STRIP: NEGATIVE
LDLC SERPL CALC-MCNC: 111 MG/DL (ref 63–159)
LEUKOCYTE ESTERASE UR QL STRIP: NEGATIVE
MICROALBUMIN UR DL<=1MG/L-MCNC: 12.8 UG/ML
NITRITE UR QL STRIP: NEGATIVE
NONHDLC SERPL-MCNC: 124 MG/DL
PH UR STRIP: 5 [PH] (ref 5–8)
POTASSIUM SERPL-SCNC: 3.7 MMOL/L (ref 3.5–5.1)
PROT SERPL-MCNC: 7.4 G/DL (ref 6–8.4)
PROT UR QL STRIP: NEGATIVE
SODIUM SERPL-SCNC: 136 MMOL/L (ref 136–145)
SP GR UR STRIP: 1 (ref 1–1.03)
TRIGL SERPL-MCNC: 65 MG/DL (ref 30–150)
TSH SERPL DL<=0.005 MIU/L-ACNC: 2.11 UIU/ML (ref 0.34–5.6)
URN SPEC COLLECT METH UR: NORMAL
UROBILINOGEN UR STRIP-ACNC: NEGATIVE EU/DL

## 2022-12-01 PROCEDURE — 80061 LIPID PANEL: CPT | Performed by: NURSE PRACTITIONER

## 2022-12-01 PROCEDURE — 84153 ASSAY OF PSA TOTAL: CPT | Performed by: NURSE PRACTITIONER

## 2022-12-01 PROCEDURE — 86803 HEPATITIS C AB TEST: CPT | Performed by: NURSE PRACTITIONER

## 2022-12-01 PROCEDURE — 36415 COLL VENOUS BLD VENIPUNCTURE: CPT | Performed by: NURSE PRACTITIONER

## 2022-12-01 PROCEDURE — 80053 COMPREHEN METABOLIC PANEL: CPT | Performed by: NURSE PRACTITIONER

## 2022-12-01 PROCEDURE — 87389 HIV-1 AG W/HIV-1&-2 AB AG IA: CPT | Performed by: NURSE PRACTITIONER

## 2022-12-01 PROCEDURE — 84443 ASSAY THYROID STIM HORMONE: CPT | Performed by: NURSE PRACTITIONER

## 2022-12-01 PROCEDURE — 82570 ASSAY OF URINE CREATININE: CPT | Performed by: NURSE PRACTITIONER

## 2022-12-01 PROCEDURE — 82043 UR ALBUMIN QUANTITATIVE: CPT | Performed by: NURSE PRACTITIONER

## 2022-12-01 PROCEDURE — 81003 URINALYSIS AUTO W/O SCOPE: CPT | Performed by: NURSE PRACTITIONER

## 2022-12-02 ENCOUNTER — PATIENT MESSAGE (OUTPATIENT)
Dept: FAMILY MEDICINE | Facility: CLINIC | Age: 57
End: 2022-12-02

## 2022-12-02 LAB
HCV AB S/CO SERPL IA: 0.1 S/CO RATIO (ref 0–0.9)
HIV 1+2 AB+HIV1 P24 AG SERPL QL IA: NON REACTIVE

## 2022-12-05 ENCOUNTER — OFFICE VISIT (OUTPATIENT)
Dept: FAMILY MEDICINE | Facility: CLINIC | Age: 57
End: 2022-12-05
Payer: MEDICAID

## 2022-12-05 VITALS
HEART RATE: 106 BPM | DIASTOLIC BLOOD PRESSURE: 86 MMHG | OXYGEN SATURATION: 96 % | RESPIRATION RATE: 16 BRPM | TEMPERATURE: 99 F | HEIGHT: 70 IN | BODY MASS INDEX: 27.1 KG/M2 | SYSTOLIC BLOOD PRESSURE: 132 MMHG | WEIGHT: 189.31 LBS

## 2022-12-05 DIAGNOSIS — R00.0 TACHYCARDIA: ICD-10-CM

## 2022-12-05 DIAGNOSIS — F17.210 HEAVY CIGARETTE SMOKER: ICD-10-CM

## 2022-12-05 DIAGNOSIS — I10 ESSENTIAL HYPERTENSION: Primary | ICD-10-CM

## 2022-12-05 DIAGNOSIS — Z12.11 SCREEN FOR COLON CANCER: ICD-10-CM

## 2022-12-05 PROCEDURE — 3060F POS MICROALBUMINURIA REV: CPT | Mod: CPTII,,, | Performed by: NURSE PRACTITIONER

## 2022-12-05 PROCEDURE — 3008F PR BODY MASS INDEX (BMI) DOCUMENTED: ICD-10-PCS | Mod: CPTII,,, | Performed by: NURSE PRACTITIONER

## 2022-12-05 PROCEDURE — 3079F DIAST BP 80-89 MM HG: CPT | Mod: CPTII,,, | Performed by: NURSE PRACTITIONER

## 2022-12-05 PROCEDURE — 3008F BODY MASS INDEX DOCD: CPT | Mod: CPTII,,, | Performed by: NURSE PRACTITIONER

## 2022-12-05 PROCEDURE — 1159F PR MEDICATION LIST DOCUMENTED IN MEDICAL RECORD: ICD-10-PCS | Mod: CPTII,,, | Performed by: NURSE PRACTITIONER

## 2022-12-05 PROCEDURE — 1160F RVW MEDS BY RX/DR IN RCRD: CPT | Mod: CPTII,,, | Performed by: NURSE PRACTITIONER

## 2022-12-05 PROCEDURE — 3079F PR MOST RECENT DIASTOLIC BLOOD PRESSURE 80-89 MM HG: ICD-10-PCS | Mod: CPTII,,, | Performed by: NURSE PRACTITIONER

## 2022-12-05 PROCEDURE — 3066F NEPHROPATHY DOC TX: CPT | Mod: CPTII,,, | Performed by: NURSE PRACTITIONER

## 2022-12-05 PROCEDURE — 1159F MED LIST DOCD IN RCRD: CPT | Mod: CPTII,,, | Performed by: NURSE PRACTITIONER

## 2022-12-05 PROCEDURE — 99214 OFFICE O/P EST MOD 30 MIN: CPT | Mod: S$PBB,,, | Performed by: NURSE PRACTITIONER

## 2022-12-05 PROCEDURE — 4010F PR ACE/ARB THEARPY RXD/TAKEN: ICD-10-PCS | Mod: CPTII,,, | Performed by: NURSE PRACTITIONER

## 2022-12-05 PROCEDURE — 99214 PR OFFICE/OUTPT VISIT, EST, LEVL IV, 30-39 MIN: ICD-10-PCS | Mod: S$PBB,,, | Performed by: NURSE PRACTITIONER

## 2022-12-05 PROCEDURE — 1160F PR REVIEW ALL MEDS BY PRESCRIBER/CLIN PHARMACIST DOCUMENTED: ICD-10-PCS | Mod: CPTII,,, | Performed by: NURSE PRACTITIONER

## 2022-12-05 PROCEDURE — 4010F ACE/ARB THERAPY RXD/TAKEN: CPT | Mod: CPTII,,, | Performed by: NURSE PRACTITIONER

## 2022-12-05 PROCEDURE — 3060F PR POS MICROALBUMINURIA RESULT DOCUMENTED/REVIEW: ICD-10-PCS | Mod: CPTII,,, | Performed by: NURSE PRACTITIONER

## 2022-12-05 PROCEDURE — 3066F PR DOCUMENTATION OF TREATMENT FOR NEPHROPATHY: ICD-10-PCS | Mod: CPTII,,, | Performed by: NURSE PRACTITIONER

## 2022-12-05 PROCEDURE — 3075F SYST BP GE 130 - 139MM HG: CPT | Mod: CPTII,,, | Performed by: NURSE PRACTITIONER

## 2022-12-05 PROCEDURE — 3075F PR MOST RECENT SYSTOLIC BLOOD PRESS GE 130-139MM HG: ICD-10-PCS | Mod: CPTII,,, | Performed by: NURSE PRACTITIONER

## 2022-12-05 PROCEDURE — 99215 OFFICE O/P EST HI 40 MIN: CPT | Performed by: NURSE PRACTITIONER

## 2022-12-05 RX ORDER — LISINOPRIL 40 MG/1
40 TABLET ORAL DAILY
Qty: 90 TABLET | Refills: 1 | Status: SHIPPED | OUTPATIENT
Start: 2022-12-05 | End: 2023-06-02 | Stop reason: SDUPTHER

## 2022-12-05 RX ORDER — METOPROLOL SUCCINATE 25 MG/1
25 TABLET, EXTENDED RELEASE ORAL DAILY
Qty: 90 TABLET | Refills: 1 | Status: SHIPPED | OUTPATIENT
Start: 2022-12-05 | End: 2023-06-02 | Stop reason: SDUPTHER

## 2022-12-05 NOTE — PROGRESS NOTES
SUBJECTIVE:      Patient ID: Wilmer Lange Jr is a 57 y.o. male.    Chief Complaint: Hypertension    Wilmer is a new patient to me switching providers in the practice. Here for follow up on HTN and for medication refills today. Taking meds as prescribed without SE or complaints- out of Toprol per compliance report, but pt states he is taking. Was rushing to get here today and admits he smoked a few cigarettes and drank a lot of coffee today. BP runs normal at home per pt. Recent lab results reviewed with pt.     Outstanding HM reviewed with pt today- recommended baseline CT chest screening for lung cancer - pt agreeable; reminded to complete Cologuard     Hypertension  This is a chronic problem. The current episode started more than 1 year ago. The problem is controlled. Pertinent negatives include no anxiety, blurred vision, chest pain, headaches, malaise/fatigue, neck pain, palpitations, peripheral edema, shortness of breath or sweats. Risk factors for coronary artery disease include smoking/tobacco exposure, stress, male gender and family history. Past treatments include ACE inhibitors and beta blockers. The current treatment provides moderate improvement. Compliance problems include exercise and diet.  There is no history of angina, kidney disease, CAD/MI, CVA or heart failure. There is no history of sleep apnea or a thyroid problem.     Family History   Problem Relation Age of Onset    Dementia Mother     Hypertension Mother     Diabetes Father     Hypertension Father     Aneurysm Father     No Known Problems Sister     No Known Problems Brother     No Known Problems Daughter     Retinal detachment Son     Blindness Son     No Known Problems Son     No Known Problems Son       Social History     Socioeconomic History    Marital status:     Number of children: 3   Occupational History    Occupation: electrical & yard work   Tobacco Use    Smoking status: Every Day     Packs/day: 1.00     Years:  37.00     Pack years: 37.00     Types: Cigarettes    Smokeless tobacco: Never   Substance and Sexual Activity    Alcohol use: Yes     Alcohol/week: 21.0 standard drinks     Types: 21 Cans of beer per week    Drug use: Never    Sexual activity: Yes     Partners: Female     Birth control/protection: None   Social History Narrative    ** Merged History Encounter **          Current Outpatient Medications   Medication Sig Dispense Refill    lisinopriL (PRINIVIL,ZESTRIL) 40 MG tablet Take 1 tablet (40 mg total) by mouth once daily. 90 tablet 1    metoprolol succinate (TOPROL-XL) 25 MG 24 hr tablet Take 1 tablet (25 mg total) by mouth once daily. 90 tablet 1     No current facility-administered medications for this visit.     Review of patient's allergies indicates:  No Known Allergies   Past Medical History:   Diagnosis Date    Hypertension      History reviewed. No pertinent surgical history.    Review of Systems   Constitutional:  Negative for activity change, appetite change, chills, diaphoresis, fatigue, fever, malaise/fatigue and unexpected weight change.   HENT:  Negative for congestion, ear pain, hearing loss, rhinorrhea, sinus pressure, sore throat, trouble swallowing and voice change.    Eyes:  Negative for blurred vision, pain, discharge and visual disturbance.   Respiratory:  Negative for cough, chest tightness, shortness of breath and wheezing.    Cardiovascular:  Negative for chest pain, palpitations and leg swelling.   Gastrointestinal:  Negative for abdominal pain, blood in stool, constipation, diarrhea, nausea and vomiting.   Endocrine: Negative for cold intolerance, heat intolerance, polydipsia and polyuria.   Genitourinary:  Negative for difficulty urinating, flank pain, frequency, hematuria and urgency.   Musculoskeletal:  Negative for arthralgias, back pain, joint swelling and neck pain.   Skin:  Negative for color change and rash.   Neurological:  Negative for dizziness, seizures, syncope, weakness,  "numbness and headaches.   Hematological:  Negative for adenopathy. Does not bruise/bleed easily.   Psychiatric/Behavioral:  Negative for confusion, dysphoric mood and sleep disturbance. The patient is not nervous/anxious.     OBJECTIVE:      Vitals:    12/05/22 0811 12/05/22 0824   BP: (!) 130/98 132/86   BP Location: Left arm    Patient Position: Sitting    BP Method: Medium (Manual)    Pulse: 106    Resp: 16    Temp: 98.5 °F (36.9 °C)    TempSrc: Oral    SpO2: 96%    Weight: 85.9 kg (189 lb 4.8 oz)    Height: 5' 10" (1.778 m)      Physical Exam  Vitals and nursing note reviewed.   Constitutional:       General: He is not in acute distress.     Appearance: Normal appearance. He is well-developed. He is not ill-appearing.   HENT:      Head: Normocephalic.      Nose: Nose normal.      Mouth/Throat:      Mouth: Mucous membranes are moist.   Eyes:      General: No scleral icterus.     Extraocular Movements: Extraocular movements intact.      Conjunctiva/sclera: Conjunctivae normal.      Pupils: Pupils are equal, round, and reactive to light.   Neck:      Thyroid: No thyroid mass or thyromegaly.      Trachea: Trachea normal.   Cardiovascular:      Rate and Rhythm: Regular rhythm. Tachycardia present.      Heart sounds: Normal heart sounds. No murmur heard.     Comments: Apical rate 102  Pulmonary:      Effort: Pulmonary effort is normal. No respiratory distress.      Breath sounds: Normal breath sounds. No wheezing or rales.   Abdominal:      General: Bowel sounds are normal.      Palpations: Abdomen is soft.   Musculoskeletal:         General: Normal range of motion.      Cervical back: Normal range of motion and neck supple.      Right lower leg: No edema.      Left lower leg: No edema.   Lymphadenopathy:      Cervical: No cervical adenopathy.   Skin:     General: Skin is warm and dry.      Coloration: Skin is not jaundiced or pale.   Neurological:      Mental Status: He is alert and oriented to person, place, and " time.   Psychiatric:         Mood and Affect: Mood normal.         Behavior: Behavior normal.         Thought Content: Thought content normal.         Judgment: Judgment normal.      Assessment:       1. Essential hypertension    2. Tachycardia    3. Heavy cigarette smoker    4. Screen for colon cancer          Plan:       Essential hypertension  -     lisinopriL (PRINIVIL,ZESTRIL) 40 MG tablet; Take 1 tablet (40 mg total) by mouth once daily.  Dispense: 90 tablet; Refill: 1  -     metoprolol succinate (TOPROL-XL) 25 MG 24 hr tablet; Take 1 tablet (25 mg total) by mouth once daily.  Dispense: 90 tablet; Refill: 1  -stable, recommend monitor at home and log for next visit; decrease smoking and caffeine/stress; labs UTD     Tachycardia  -     metoprolol succinate (TOPROL-XL) 25 MG 24 hr tablet; Take 1 tablet (25 mg total) by mouth once daily.  Dispense: 90 tablet; Refill: 1    Heavy cigarette smoker  -     CT Chest Lung Screening Low Dose; Future; Expected date: 12/05/2022   -baseline CT screening; declines help quitting     Screen for colon cancer   -reminded about Cologuard     Follow up in 6 months (on 6/5/2023) for f/u HTN .      12/5/2022 Chani Lares, RONNIE, FNP    This note was created using [employer] voice recognition software that occasionally misinterprets phrases or words.

## 2022-12-07 ENCOUNTER — HOSPITAL ENCOUNTER (OUTPATIENT)
Dept: RADIOLOGY | Facility: HOSPITAL | Age: 57
Discharge: HOME | End: 2022-12-07
Attending: NURSE PRACTITIONER
Payer: MEDICAID

## 2022-12-07 DIAGNOSIS — F17.210 HEAVY CIGARETTE SMOKER: ICD-10-CM

## 2022-12-07 PROCEDURE — 71271 CT THORAX LUNG CANCER SCR C-: CPT | Mod: TC,PO

## 2022-12-07 NOTE — PROGRESS NOTES
CT lung cancer screening shows a small pulmonary nodule that does not appear to be malignant; radiology feels it is benign; we should repeat this scan annually and he should reduce and stop smoking

## 2023-01-02 ENCOUNTER — PATIENT MESSAGE (OUTPATIENT)
Dept: FAMILY MEDICINE | Facility: CLINIC | Age: 58
End: 2023-01-02

## 2023-01-03 ENCOUNTER — PATIENT MESSAGE (OUTPATIENT)
Dept: FAMILY MEDICINE | Facility: CLINIC | Age: 58
End: 2023-01-03

## 2023-01-15 ENCOUNTER — PATIENT MESSAGE (OUTPATIENT)
Dept: FAMILY MEDICINE | Facility: CLINIC | Age: 58
End: 2023-01-15

## 2023-01-30 ENCOUNTER — OFFICE VISIT (OUTPATIENT)
Dept: FAMILY MEDICINE | Facility: CLINIC | Age: 58
End: 2023-01-30
Payer: MEDICAID

## 2023-01-30 ENCOUNTER — LAB VISIT (OUTPATIENT)
Dept: LAB | Facility: HOSPITAL | Age: 58
End: 2023-01-30
Attending: NURSE PRACTITIONER
Payer: MEDICAID

## 2023-01-30 VITALS
BODY MASS INDEX: 27.47 KG/M2 | WEIGHT: 191.88 LBS | DIASTOLIC BLOOD PRESSURE: 85 MMHG | HEIGHT: 70 IN | OXYGEN SATURATION: 99 % | RESPIRATION RATE: 20 BRPM | HEART RATE: 113 BPM | TEMPERATURE: 99 F | SYSTOLIC BLOOD PRESSURE: 117 MMHG

## 2023-01-30 DIAGNOSIS — R10.32 LEFT LOWER QUADRANT PAIN: ICD-10-CM

## 2023-01-30 DIAGNOSIS — R10.32 LEFT LOWER QUADRANT PAIN: Primary | ICD-10-CM

## 2023-01-30 DIAGNOSIS — R14.0 ABDOMINAL BLOATING: ICD-10-CM

## 2023-01-30 LAB
ALBUMIN SERPL BCP-MCNC: 3.7 G/DL (ref 3.5–5.2)
ALP SERPL-CCNC: 70 U/L (ref 55–135)
ALT SERPL W/O P-5'-P-CCNC: 22 U/L (ref 10–44)
ANION GAP SERPL CALC-SCNC: 10 MMOL/L (ref 8–16)
AST SERPL-CCNC: 18 U/L (ref 10–40)
BASOPHILS # BLD AUTO: 0.04 K/UL (ref 0–0.2)
BASOPHILS NFR BLD: 0.3 % (ref 0–1.9)
BILIRUB SERPL-MCNC: 0.9 MG/DL (ref 0.1–1)
BUN SERPL-MCNC: 9 MG/DL (ref 6–20)
CALCIUM SERPL-MCNC: 9.2 MG/DL (ref 8.7–10.5)
CHLORIDE SERPL-SCNC: 98 MMOL/L (ref 95–110)
CO2 SERPL-SCNC: 26 MMOL/L (ref 23–29)
CREAT SERPL-MCNC: 0.6 MG/DL (ref 0.5–1.4)
DIFFERENTIAL METHOD: ABNORMAL
EOSINOPHIL # BLD AUTO: 0.1 K/UL (ref 0–0.5)
EOSINOPHIL NFR BLD: 0.9 % (ref 0–8)
ERYTHROCYTE [DISTWIDTH] IN BLOOD BY AUTOMATED COUNT: 12.9 % (ref 11.5–14.5)
EST. GFR  (NO RACE VARIABLE): >60 ML/MIN/1.73 M^2
GLUCOSE SERPL-MCNC: 102 MG/DL (ref 70–110)
HCT VFR BLD AUTO: 44.3 % (ref 40–54)
HGB BLD-MCNC: 14.9 G/DL (ref 14–18)
IMM GRANULOCYTES # BLD AUTO: 0.08 K/UL (ref 0–0.04)
IMM GRANULOCYTES NFR BLD AUTO: 0.6 % (ref 0–0.5)
LIPASE SERPL-CCNC: 57 U/L (ref 4–60)
LYMPHOCYTES # BLD AUTO: 1.4 K/UL (ref 1–4.8)
LYMPHOCYTES NFR BLD: 9.8 % (ref 18–48)
MCH RBC QN AUTO: 34.5 PG (ref 27–31)
MCHC RBC AUTO-ENTMCNC: 33.6 G/DL (ref 32–36)
MCV RBC AUTO: 103 FL (ref 82–98)
MONOCYTES # BLD AUTO: 1.1 K/UL (ref 0.3–1)
MONOCYTES NFR BLD: 7.9 % (ref 4–15)
NEUTROPHILS # BLD AUTO: 11.2 K/UL (ref 1.8–7.7)
NEUTROPHILS NFR BLD: 80.5 % (ref 38–73)
NRBC BLD-RTO: 0 /100 WBC
PLATELET # BLD AUTO: 350 K/UL (ref 150–450)
PMV BLD AUTO: 10 FL (ref 9.2–12.9)
POTASSIUM SERPL-SCNC: 3.9 MMOL/L (ref 3.5–5.1)
PROT SERPL-MCNC: 7.6 G/DL (ref 6–8.4)
RBC # BLD AUTO: 4.32 M/UL (ref 4.6–6.2)
SODIUM SERPL-SCNC: 134 MMOL/L (ref 136–145)
WBC # BLD AUTO: 13.9 K/UL (ref 3.9–12.7)

## 2023-01-30 PROCEDURE — 1159F MED LIST DOCD IN RCRD: CPT | Mod: CPTII,,, | Performed by: NURSE PRACTITIONER

## 2023-01-30 PROCEDURE — 3079F DIAST BP 80-89 MM HG: CPT | Mod: CPTII,,, | Performed by: NURSE PRACTITIONER

## 2023-01-30 PROCEDURE — 99214 PR OFFICE/OUTPT VISIT, EST, LEVL IV, 30-39 MIN: ICD-10-PCS | Mod: S$PBB,,, | Performed by: NURSE PRACTITIONER

## 2023-01-30 PROCEDURE — 99214 OFFICE O/P EST MOD 30 MIN: CPT | Mod: S$PBB,,, | Performed by: NURSE PRACTITIONER

## 2023-01-30 PROCEDURE — 3079F PR MOST RECENT DIASTOLIC BLOOD PRESSURE 80-89 MM HG: ICD-10-PCS | Mod: CPTII,,, | Performed by: NURSE PRACTITIONER

## 2023-01-30 PROCEDURE — 1160F PR REVIEW ALL MEDS BY PRESCRIBER/CLIN PHARMACIST DOCUMENTED: ICD-10-PCS | Mod: CPTII,,, | Performed by: NURSE PRACTITIONER

## 2023-01-30 PROCEDURE — 1160F RVW MEDS BY RX/DR IN RCRD: CPT | Mod: CPTII,,, | Performed by: NURSE PRACTITIONER

## 2023-01-30 PROCEDURE — 3074F PR MOST RECENT SYSTOLIC BLOOD PRESSURE < 130 MM HG: ICD-10-PCS | Mod: CPTII,,, | Performed by: NURSE PRACTITIONER

## 2023-01-30 PROCEDURE — 83690 ASSAY OF LIPASE: CPT | Performed by: NURSE PRACTITIONER

## 2023-01-30 PROCEDURE — 1159F PR MEDICATION LIST DOCUMENTED IN MEDICAL RECORD: ICD-10-PCS | Mod: CPTII,,, | Performed by: NURSE PRACTITIONER

## 2023-01-30 PROCEDURE — 36415 COLL VENOUS BLD VENIPUNCTURE: CPT | Performed by: NURSE PRACTITIONER

## 2023-01-30 PROCEDURE — 85025 COMPLETE CBC W/AUTO DIFF WBC: CPT | Performed by: NURSE PRACTITIONER

## 2023-01-30 PROCEDURE — 3074F SYST BP LT 130 MM HG: CPT | Mod: CPTII,,, | Performed by: NURSE PRACTITIONER

## 2023-01-30 PROCEDURE — 99214 OFFICE O/P EST MOD 30 MIN: CPT | Performed by: NURSE PRACTITIONER

## 2023-01-30 PROCEDURE — 3008F PR BODY MASS INDEX (BMI) DOCUMENTED: ICD-10-PCS | Mod: CPTII,,, | Performed by: NURSE PRACTITIONER

## 2023-01-30 PROCEDURE — 3008F BODY MASS INDEX DOCD: CPT | Mod: CPTII,,, | Performed by: NURSE PRACTITIONER

## 2023-01-30 PROCEDURE — 80053 COMPREHEN METABOLIC PANEL: CPT | Performed by: NURSE PRACTITIONER

## 2023-01-30 NOTE — PROGRESS NOTES
"    SUBJECTIVE:      Patient ID: Wilmer Lange Jr is a 57 y.o. male.    Chief Complaint: Abdominal Pain and Bloated    HPI    Family History   Problem Relation Age of Onset    Dementia Mother     Hypertension Mother     Diabetes Father     Hypertension Father     Aneurysm Father     No Known Problems Sister     No Known Problems Brother     No Known Problems Daughter     Retinal detachment Son     Blindness Son     No Known Problems Son     No Known Problems Son       Social History     Socioeconomic History    Marital status:     Number of children: 3   Occupational History    Occupation: electrical & yard work   Tobacco Use    Smoking status: Every Day     Packs/day: 1.00     Years: 37.00     Pack years: 37.00     Types: Cigarettes    Smokeless tobacco: Never   Substance and Sexual Activity    Alcohol use: Yes     Alcohol/week: 21.0 standard drinks     Types: 21 Cans of beer per week    Drug use: Never    Sexual activity: Yes     Partners: Female     Birth control/protection: None   Social History Narrative    ** Merged History Encounter **          Current Outpatient Medications   Medication Sig Dispense Refill    lisinopriL (PRINIVIL,ZESTRIL) 40 MG tablet Take 1 tablet (40 mg total) by mouth once daily. 90 tablet 1    metoprolol succinate (TOPROL-XL) 25 MG 24 hr tablet Take 1 tablet (25 mg total) by mouth once daily. 90 tablet 1     No current facility-administered medications for this visit.     Review of patient's allergies indicates:  No Known Allergies   Past Medical History:   Diagnosis Date    Hypertension      History reviewed. No pertinent surgical history.    Review of Systems   OBJECTIVE:      Vitals:    01/30/23 1347   BP: 117/85   BP Location: Left arm   Patient Position: Sitting   BP Method: Medium (Automatic)   Pulse: (!) 113   Resp: 20   Temp: 98.7 °F (37.1 °C)   SpO2: 99%   Weight: 87 kg (191 lb 14.4 oz)   Height: 5' 10" (1.778 m)     Physical Exam   Assessment: "       No diagnosis found.    Plan:       There are no diagnoses linked to this encounter.    No follow-ups on file.      1/30/2023 RONNIE Espinal, FNP    This note was created using [email or web address] voice recognition software that occasionally misinterprets phrases or words.

## 2023-01-30 NOTE — PROGRESS NOTES
SUBJECTIVE:      Patient ID: Wilmer Lange Jr is a 57 y.o. male.    Chief Complaint: Abdominal Pain and Bloated    Mr. Guillen presents to the clinic with increase LLQ ABD pain and bloating. PT reports that the LLQ pain and bloating began 2 weeks ago. States he was trying to ignore, but has gradually worsen. Pt reports that he feels the urge and need to belch, but unable to do so. Reports that pain to his LLQ is sharp and radiates to  the epigastric region. He has attempted to drink carbonated soda to help belch and relieve pain, but was not effective. He also attempted Pepcid and was also not effective. Pt denies N/V/D, blood in stool, pain with defecation, or fever. No past history of abd surgery. He reports that he had a regular BM this morning. He has had a decrease in oral intake r/t discomfort and feeling of fullness. Pt reports he drinks >6 beers a day, but not in the past 4 days.     Abdominal Pain  The current episode started 1 to 4 weeks ago. The onset quality is gradual. The problem occurs constantly. The problem has been rapidly worsening. The pain is located in the LLQ. The pain is moderate. The quality of the pain is sharp and cramping. The abdominal pain radiates to the chest. Associated symptoms include anorexia, belching (urge, but able too) and flatus. Pertinent negatives include no arthralgias, constipation, diarrhea, dysuria, fever, frequency, headaches, hematuria, melena, myalgias, nausea or vomiting. The pain is aggravated by eating and movement. The pain is relieved by Nothing. He has tried H2 blockers for the symptoms. The treatment provided no relief. There is no history of abdominal surgery, colon cancer, Crohn's disease, gallstones, GERD, irritable bowel syndrome, pancreatitis, PUD or ulcerative colitis. Patient's medical history does not include kidney stones.     Family History   Problem Relation Age of Onset    Dementia Mother     Hypertension Mother     Diabetes Father      Hypertension Father     Aneurysm Father     No Known Problems Sister     No Known Problems Brother     No Known Problems Daughter     Retinal detachment Son     Blindness Son     No Known Problems Son     No Known Problems Son       Social History     Socioeconomic History    Marital status:     Number of children: 3   Occupational History    Occupation: electrical & yard work   Tobacco Use    Smoking status: Every Day     Packs/day: 1.00     Years: 37.00     Pack years: 37.00     Types: Cigarettes    Smokeless tobacco: Never   Substance and Sexual Activity    Alcohol use: Yes     Alcohol/week: 21.0 standard drinks     Types: 21 Cans of beer per week    Drug use: Never    Sexual activity: Yes     Partners: Female     Birth control/protection: None   Social History Narrative    ** Merged History Encounter **          Current Outpatient Medications   Medication Sig Dispense Refill    lisinopriL (PRINIVIL,ZESTRIL) 40 MG tablet Take 1 tablet (40 mg total) by mouth once daily. 90 tablet 1    metoprolol succinate (TOPROL-XL) 25 MG 24 hr tablet Take 1 tablet (25 mg total) by mouth once daily. 90 tablet 1     No current facility-administered medications for this visit.     Review of patient's allergies indicates:  No Known Allergies   Past Medical History:   Diagnosis Date    Hypertension      History reviewed. No pertinent surgical history.    Review of Systems   Constitutional:  Positive for appetite change. Negative for chills, diaphoresis, fatigue, fever and unexpected weight change.   HENT:  Negative for congestion, ear pain, sore throat and voice change.    Eyes:  Negative for photophobia, pain and visual disturbance.   Respiratory:  Negative for cough, shortness of breath and wheezing.    Cardiovascular:  Negative for chest pain, palpitations and leg swelling.   Gastrointestinal:  Positive for abdominal distention, abdominal pain, anorexia and flatus. Negative for blood in stool, constipation, diarrhea,  "melena, nausea and vomiting.   Endocrine: Negative for cold intolerance, heat intolerance and polydipsia.   Genitourinary:  Negative for difficulty urinating, dysuria, frequency and hematuria.   Musculoskeletal:  Negative for arthralgias and myalgias.   Skin:  Negative for color change and rash.   Neurological:  Negative for dizziness, weakness and headaches.   Hematological:  Negative for adenopathy. Does not bruise/bleed easily.   Psychiatric/Behavioral:  Negative for dysphoric mood. The patient is not nervous/anxious.     OBJECTIVE:      Vitals:    01/30/23 1347   BP: 117/85   BP Location: Left arm   Patient Position: Sitting   BP Method: Medium (Automatic)   Pulse: (!) 113   Resp: 20   Temp: 98.7 °F (37.1 °C)   SpO2: 99%   Weight: 87 kg (191 lb 14.4 oz)   Height: 5' 10" (1.778 m)     Physical Exam  Vitals and nursing note reviewed.   Constitutional:       General: He is not in acute distress.     Appearance: Normal appearance. He is well-developed. He is obese. He is not ill-appearing.   HENT:      Head: Normocephalic and atraumatic.      Nose: Nose normal.      Mouth/Throat:      Mouth: Mucous membranes are moist.      Pharynx: Oropharynx is clear. No oropharyngeal exudate.   Eyes:      General: No scleral icterus.     Extraocular Movements: Extraocular movements intact.      Conjunctiva/sclera: Conjunctivae normal.      Pupils: Pupils are equal, round, and reactive to light.   Neck:      Thyroid: No thyroid mass or thyromegaly.      Trachea: Trachea normal.   Cardiovascular:      Rate and Rhythm: Regular rhythm. Tachycardia present.      Heart sounds: Normal heart sounds. No murmur heard.  Pulmonary:      Effort: Pulmonary effort is normal. No respiratory distress.      Breath sounds: Normal breath sounds. No wheezing or rales.   Abdominal:      General: Bowel sounds are decreased. There is distension.      Palpations: Abdomen is soft. There is no hepatomegaly, mass or pulsatile mass.      Tenderness: There is " abdominal tenderness in the left lower quadrant. There is guarding. There is no right CVA tenderness, left CVA tenderness or rebound. Negative signs include Lloyd's sign, Rovsing's sign and McBurney's sign.   Musculoskeletal:         General: Normal range of motion.      Cervical back: Normal range of motion and neck supple.      Right lower leg: No edema.      Left lower leg: No edema.   Lymphadenopathy:      Cervical: No cervical adenopathy.   Skin:     General: Skin is warm and dry.      Coloration: Skin is not jaundiced or pale.   Neurological:      Mental Status: He is alert and oriented to person, place, and time.   Psychiatric:         Mood and Affect: Mood normal.         Behavior: Behavior normal.         Thought Content: Thought content normal.         Judgment: Judgment normal.      Assessment:       1. Left lower quadrant pain    2. Abdominal bloating          Plan:       Left lower quadrant pain  -     CBC Auto Differential; Future; Expected date: 01/30/2023  -     Comprehensive Metabolic Panel; Future; Expected date: 01/30/2023  -     Lipase; Future; Expected date: 01/30/2023  -     CT Abdomen Pelvis W Wo Contrast; Future; Expected date: 01/30/2023    Abdominal bloating  -     CBC Auto Differential; Future; Expected date: 01/30/2023  -     Comprehensive Metabolic Panel; Future; Expected date: 01/30/2023  -     Lipase; Future; Expected date: 01/30/2023  -     CT Abdomen Pelvis W Wo Contrast; Future; Expected date: 01/30/2023    -labs ordered, CT abdomen and pelvis ordered stat; avoid alcohol- start reducing amt daily; increase water intake, bland soft diet encouraged; will rule out diverticulitis with imaging and labs, but patient instructed to proceed to ER if he has any new symptoms including fever, increased pain, vomiting or diarrhea, or other complaints and he voiced understanding      No follow-ups on file.      1/30/2023 RONNIE Espinal, FNP    This note was created using [employer] voice  recognition software that occasionally misinterprets phrases or words.

## 2023-01-31 ENCOUNTER — TELEPHONE (OUTPATIENT)
Dept: FAMILY MEDICINE | Facility: CLINIC | Age: 58
End: 2023-01-31

## 2023-01-31 ENCOUNTER — HOSPITAL ENCOUNTER (OUTPATIENT)
Dept: RADIOLOGY | Facility: HOSPITAL | Age: 58
Discharge: HOME | End: 2023-01-31
Attending: NURSE PRACTITIONER
Payer: MEDICAID

## 2023-01-31 DIAGNOSIS — R14.0 ABDOMINAL BLOATING: ICD-10-CM

## 2023-01-31 DIAGNOSIS — R10.32 LEFT LOWER QUADRANT PAIN: ICD-10-CM

## 2023-01-31 DIAGNOSIS — Z12.11 SCREEN FOR COLON CANCER: ICD-10-CM

## 2023-01-31 DIAGNOSIS — K85.20 ALCOHOL-INDUCED ACUTE PANCREATITIS WITHOUT INFECTION OR NECROSIS: Primary | ICD-10-CM

## 2023-01-31 PROCEDURE — 25500020 PHARM REV CODE 255: Mod: PO | Performed by: NURSE PRACTITIONER

## 2023-01-31 PROCEDURE — 74177 CT ABD & PELVIS W/CONTRAST: CPT | Mod: TC,PO

## 2023-01-31 RX ADMIN — IOHEXOL 100 ML: 350 INJECTION, SOLUTION INTRAVENOUS at 11:01

## 2023-01-31 NOTE — TELEPHONE ENCOUNTER
Spoke to patient about CT results; discussed findings and treatment recommendations; patient is not in any pain today but is complaining of continual bloating; denies any alcohol intake in the last 5 days; advise remain in alcohol cessation, follow-up on pancreatitis and pancreatic cyst with GI; urgent referral placed today, patient advised to go to emergency room if his symptoms change

## 2023-06-02 DIAGNOSIS — I10 ESSENTIAL HYPERTENSION: ICD-10-CM

## 2023-06-02 DIAGNOSIS — R00.0 TACHYCARDIA: ICD-10-CM

## 2023-06-02 RX ORDER — LISINOPRIL 40 MG/1
40 TABLET ORAL DAILY
Qty: 90 TABLET | Refills: 1 | Status: SHIPPED | OUTPATIENT
Start: 2023-06-02 | End: 2023-07-13

## 2023-06-02 RX ORDER — METOPROLOL SUCCINATE 25 MG/1
25 TABLET, EXTENDED RELEASE ORAL DAILY
Qty: 90 TABLET | Refills: 1 | Status: SHIPPED | OUTPATIENT
Start: 2023-06-02 | End: 2023-11-17

## 2023-07-13 ENCOUNTER — OFFICE VISIT (OUTPATIENT)
Dept: FAMILY MEDICINE | Facility: CLINIC | Age: 58
End: 2023-07-13
Payer: MEDICAID

## 2023-07-13 VITALS
WEIGHT: 180.38 LBS | HEART RATE: 84 BPM | OXYGEN SATURATION: 95 % | RESPIRATION RATE: 18 BRPM | DIASTOLIC BLOOD PRESSURE: 96 MMHG | HEIGHT: 70 IN | BODY MASS INDEX: 25.82 KG/M2 | SYSTOLIC BLOOD PRESSURE: 136 MMHG | TEMPERATURE: 98 F

## 2023-07-13 DIAGNOSIS — K86.2 PANCREATIC PSEUDOCYST/CYST: ICD-10-CM

## 2023-07-13 DIAGNOSIS — R93.5 ABNORMAL ABDOMINAL CT SCAN: Primary | ICD-10-CM

## 2023-07-13 DIAGNOSIS — Z12.11 SCREEN FOR COLON CANCER: ICD-10-CM

## 2023-07-13 DIAGNOSIS — I10 ESSENTIAL HYPERTENSION: ICD-10-CM

## 2023-07-13 DIAGNOSIS — K86.3 PANCREATIC PSEUDOCYST/CYST: ICD-10-CM

## 2023-07-13 PROCEDURE — 3075F SYST BP GE 130 - 139MM HG: CPT | Mod: CPTII,,, | Performed by: NURSE PRACTITIONER

## 2023-07-13 PROCEDURE — 4010F PR ACE/ARB THEARPY RXD/TAKEN: ICD-10-PCS | Mod: CPTII,,, | Performed by: NURSE PRACTITIONER

## 2023-07-13 PROCEDURE — 1160F RVW MEDS BY RX/DR IN RCRD: CPT | Mod: CPTII,,, | Performed by: NURSE PRACTITIONER

## 2023-07-13 PROCEDURE — 3080F PR MOST RECENT DIASTOLIC BLOOD PRESSURE >= 90 MM HG: ICD-10-PCS | Mod: CPTII,,, | Performed by: NURSE PRACTITIONER

## 2023-07-13 PROCEDURE — 99214 OFFICE O/P EST MOD 30 MIN: CPT | Performed by: NURSE PRACTITIONER

## 2023-07-13 PROCEDURE — 1160F PR REVIEW ALL MEDS BY PRESCRIBER/CLIN PHARMACIST DOCUMENTED: ICD-10-PCS | Mod: CPTII,,, | Performed by: NURSE PRACTITIONER

## 2023-07-13 PROCEDURE — 1159F MED LIST DOCD IN RCRD: CPT | Mod: CPTII,,, | Performed by: NURSE PRACTITIONER

## 2023-07-13 PROCEDURE — 99214 PR OFFICE/OUTPT VISIT, EST, LEVL IV, 30-39 MIN: ICD-10-PCS | Mod: S$PBB,,, | Performed by: NURSE PRACTITIONER

## 2023-07-13 PROCEDURE — 1159F PR MEDICATION LIST DOCUMENTED IN MEDICAL RECORD: ICD-10-PCS | Mod: CPTII,,, | Performed by: NURSE PRACTITIONER

## 2023-07-13 PROCEDURE — 3080F DIAST BP >= 90 MM HG: CPT | Mod: CPTII,,, | Performed by: NURSE PRACTITIONER

## 2023-07-13 PROCEDURE — 4010F ACE/ARB THERAPY RXD/TAKEN: CPT | Mod: CPTII,,, | Performed by: NURSE PRACTITIONER

## 2023-07-13 PROCEDURE — 3075F PR MOST RECENT SYSTOLIC BLOOD PRESS GE 130-139MM HG: ICD-10-PCS | Mod: CPTII,,, | Performed by: NURSE PRACTITIONER

## 2023-07-13 PROCEDURE — 3008F PR BODY MASS INDEX (BMI) DOCUMENTED: ICD-10-PCS | Mod: CPTII,,, | Performed by: NURSE PRACTITIONER

## 2023-07-13 PROCEDURE — 3008F BODY MASS INDEX DOCD: CPT | Mod: CPTII,,, | Performed by: NURSE PRACTITIONER

## 2023-07-13 PROCEDURE — 99214 OFFICE O/P EST MOD 30 MIN: CPT | Mod: S$PBB,,, | Performed by: NURSE PRACTITIONER

## 2023-07-13 RX ORDER — OLMESARTAN MEDOXOMIL 20 MG/1
20 TABLET ORAL DAILY
Qty: 90 TABLET | Refills: 1 | Status: SHIPPED | OUTPATIENT
Start: 2023-07-13 | End: 2024-01-02

## 2023-07-13 NOTE — PROGRESS NOTES
SUBJECTIVE:      Patient ID: Wilmer Lange Jr is a 57 y.o. male.    Chief Complaint: No chief complaint on file.    Wilmer is here for follow up on HTN and for medication refills today. Taking meds as prescribed without SE or complaints- BP is above goal today and has been running high at home per pt. Still smoking cigarettes, under some stress due to home renovations. Otherwise feeling well and denies any more abdominal pain. Stopped drinking alcohol/vodka after last visit- only has a few beers occasionally. Down 11 lbs since last visit in 1/23. Has not seen GI Dr. Flores- was let go from the practice due to not showing up for appts. CT abdomen was abnormal of his pancreas- needs repeat imaging.     Outstanding HM reviewed with pt today    Hypertension  This is a chronic problem. The current episode started more than 1 year ago. The problem has been gradually worsening since onset. The problem is uncontrolled. Pertinent negatives include no anxiety, blurred vision, chest pain, headaches, malaise/fatigue, neck pain, palpitations, peripheral edema, shortness of breath or sweats. Risk factors for coronary artery disease include smoking/tobacco exposure, stress, male gender and family history. Past treatments include ACE inhibitors and beta blockers. The current treatment provides moderate improvement. Compliance problems include exercise and diet.  There is no history of angina, kidney disease, CAD/MI, CVA or heart failure. There is no history of sleep apnea or a thyroid problem.     Family History   Problem Relation Age of Onset    Dementia Mother     Hypertension Mother     Diabetes Father     Hypertension Father     Aneurysm Father     No Known Problems Sister     No Known Problems Brother     No Known Problems Daughter     Retinal detachment Son     Blindness Son     No Known Problems Son     No Known Problems Son       Social History     Socioeconomic History    Marital status:     Number of  children: 3   Occupational History    Occupation: electrical & yard work   Tobacco Use    Smoking status: Every Day     Packs/day: 2.00     Years: 37.00     Pack years: 74.00     Types: Cigarettes    Smokeless tobacco: Never   Substance and Sexual Activity    Alcohol use: Yes     Alcohol/week: 21.0 standard drinks     Types: 21 Cans of beer per week    Drug use: Never    Sexual activity: Yes     Partners: Female     Birth control/protection: None   Social History Narrative    ** Merged History Encounter **          Current Outpatient Medications   Medication Sig Dispense Refill    metoprolol succinate (TOPROL-XL) 25 MG 24 hr tablet Take 1 tablet (25 mg total) by mouth once daily. 90 tablet 1    olmesartan (BENICAR) 20 MG tablet Take 1 tablet (20 mg total) by mouth once daily. 90 tablet 1     No current facility-administered medications for this visit.     Review of patient's allergies indicates:  No Known Allergies   Past Medical History:   Diagnosis Date    Hypertension      History reviewed. No pertinent surgical history.    Review of Systems   Constitutional:  Negative for activity change, appetite change, chills, diaphoresis, fatigue, fever, malaise/fatigue and unexpected weight change.   HENT:  Positive for hearing loss. Negative for congestion, ear pain, rhinorrhea, sinus pressure, sore throat, trouble swallowing and voice change.    Eyes:  Negative for blurred vision, pain and discharge.   Respiratory:  Negative for cough, chest tightness, shortness of breath and wheezing.    Cardiovascular:  Negative for chest pain, palpitations and leg swelling.   Gastrointestinal:  Negative for abdominal pain, blood in stool, constipation, diarrhea, nausea and vomiting.   Endocrine: Negative for cold intolerance, heat intolerance, polydipsia and polyuria.   Genitourinary:  Negative for difficulty urinating, flank pain, frequency, hematuria and urgency.   Musculoskeletal:  Positive for arthralgias and joint swelling.  "Negative for back pain and neck pain.   Skin:  Negative for color change and rash.   Neurological:  Negative for dizziness, seizures, syncope, weakness, numbness and headaches.   Hematological:  Negative for adenopathy. Does not bruise/bleed easily.   Psychiatric/Behavioral:  Negative for confusion, dysphoric mood and sleep disturbance. The patient is not nervous/anxious.     OBJECTIVE:      Vitals:    07/13/23 0850 07/13/23 0900   BP: (!) 149/102 (!) 136/96   BP Location: Right arm    Patient Position: Sitting    BP Method: Medium (Manual)    Pulse: 84    Resp: 18    Temp: 98.3 °F (36.8 °C)    TempSrc: Oral    SpO2: 95%    Weight: 81.8 kg (180 lb 6.4 oz)    Height: 5' 10" (1.778 m)      Physical Exam  Vitals and nursing note reviewed. Exam conducted with a chaperone present.   Constitutional:       General: He is not in acute distress.     Appearance: Normal appearance. He is well-developed and normal weight. He is not ill-appearing.   HENT:      Head: Normocephalic.      Nose: Nose normal.      Mouth/Throat:      Mouth: Mucous membranes are moist.   Eyes:      General: No scleral icterus.     Pupils: Pupils are equal, round, and reactive to light.   Neck:      Thyroid: No thyroid mass or thyromegaly.      Trachea: Trachea normal.   Cardiovascular:      Rate and Rhythm: Normal rate and regular rhythm.      Heart sounds: Normal heart sounds. No murmur heard.  Pulmonary:      Effort: Pulmonary effort is normal. No respiratory distress.      Breath sounds: Normal breath sounds. No wheezing or rales.   Abdominal:      General: Bowel sounds are normal.      Palpations: Abdomen is soft.      Tenderness: There is no abdominal tenderness.      Comments: round protuberant abdomen    Musculoskeletal:         General: Normal range of motion.      Cervical back: Normal range of motion and neck supple.      Right lower leg: No edema.      Left lower leg: No edema.   Lymphadenopathy:      Cervical: No cervical adenopathy. "   Skin:     General: Skin is warm and dry.      Coloration: Skin is not jaundiced or pale.   Neurological:      Mental Status: He is alert and oriented to person, place, and time.   Psychiatric:         Mood and Affect: Mood normal.         Behavior: Behavior normal.         Thought Content: Thought content normal.         Judgment: Judgment normal.      Assessment:       1. Abnormal abdominal CT scan    2. Essential hypertension    3. Pancreatic pseudocyst/cyst    4. Screen for colon cancer        Plan:       Abnormal abdominal CT scan  -     CT Abdomen With Contrast; Future; Expected date: 07/13/2023  -needs repeat imaging    Essential hypertension  -     olmesartan (BENICAR) 20 MG tablet; Take 1 tablet (20 mg total) by mouth once daily.  Dispense: 90 tablet; Refill: 1  -change to Benicar 20 mg, stop ACE daily; continue metoprolol; recheck at home daily and here in 1 mo    Pancreatic pseudocyst/cyst  -     CT Abdomen With Contrast; Future; Expected date: 07/13/2023  -recommended alcohol cessation, will need new GI specialist most likely if imaging abnormal again; will contact patient with results when received    Screen for colon cancer   -will need new GI specialist       Follow up in about 1 month (around 8/13/2023) for f/u HTN .      7/13/2023 RONNIE Espinal, HERBERTP    This note was created using [employer] voice recognition software that occasionally misinterprets phrases or words.

## 2023-08-04 ENCOUNTER — HOSPITAL ENCOUNTER (OUTPATIENT)
Dept: RADIOLOGY | Facility: HOSPITAL | Age: 58
Discharge: HOME | End: 2023-08-04
Attending: NURSE PRACTITIONER
Payer: MEDICAID

## 2023-08-04 DIAGNOSIS — R93.5 ABNORMAL ABDOMINAL CT SCAN: ICD-10-CM

## 2023-08-04 DIAGNOSIS — K86.3 PANCREATIC PSEUDOCYST/CYST: ICD-10-CM

## 2023-08-04 DIAGNOSIS — K86.2 PANCREATIC PSEUDOCYST/CYST: ICD-10-CM

## 2023-08-04 LAB
CREAT SERPL-MCNC: 0.8 MG/DL (ref 0.5–1.4)
SAMPLE: NORMAL

## 2023-08-04 PROCEDURE — 25500020 PHARM REV CODE 255: Mod: PO | Performed by: NURSE PRACTITIONER

## 2023-08-04 PROCEDURE — 74170 CT ABD WO CNTRST FLWD CNTRST: CPT | Mod: TC,PO

## 2023-08-04 RX ADMIN — IOHEXOL 100 ML: 350 INJECTION, SOLUTION INTRAVENOUS at 09:08

## 2023-08-04 NOTE — PROGRESS NOTES
Please notify patient that his pancreatitis and fluid collection/cysts are improving; the pseudocyst near the pancreatic tail is decreased in size but still present, enlarged liver and fatty buildup on liver present, patient needs to follow-up with GI specialist that I referred him to for further evaluation and treatment

## 2023-08-08 ENCOUNTER — TELEPHONE (OUTPATIENT)
Dept: FAMILY MEDICINE | Facility: CLINIC | Age: 58
End: 2023-08-08

## 2023-08-08 DIAGNOSIS — R93.5 ABNORMAL ABDOMINAL CT SCAN: Primary | ICD-10-CM

## 2023-08-08 DIAGNOSIS — Z12.11 SCREEN FOR COLON CANCER: ICD-10-CM

## 2023-08-08 DIAGNOSIS — K86.3 PANCREATIC PSEUDOCYST/CYST: ICD-10-CM

## 2023-08-08 DIAGNOSIS — K86.2 PANCREATIC PSEUDOCYST/CYST: ICD-10-CM

## 2023-08-08 NOTE — TELEPHONE ENCOUNTER
----- Message from FRANCISCO Diaz sent at 8/4/2023  2:00 PM CDT -----    Please notify patient that his pancreatitis and fluid collection/cysts are improving; the pseudocyst near the pancreatic tail is decreased in size but still present, enlarged liver and fatty buildup on liver present, patient needs to follow-up with GI specialist that I referred him to for further evaluation and treatment

## 2023-08-08 NOTE — TELEPHONE ENCOUNTER
Called patient to give results. Received voice mail. Left message letting patient know results and message can be seen on My Chart or can return call.

## 2023-09-07 ENCOUNTER — OFFICE VISIT (OUTPATIENT)
Dept: FAMILY MEDICINE | Facility: CLINIC | Age: 58
End: 2023-09-07
Payer: MEDICAID

## 2023-09-07 VITALS
RESPIRATION RATE: 20 BRPM | SYSTOLIC BLOOD PRESSURE: 130 MMHG | WEIGHT: 179.81 LBS | HEART RATE: 88 BPM | BODY MASS INDEX: 25.74 KG/M2 | HEIGHT: 70 IN | DIASTOLIC BLOOD PRESSURE: 82 MMHG | OXYGEN SATURATION: 98 % | TEMPERATURE: 99 F

## 2023-09-07 DIAGNOSIS — I10 ESSENTIAL HYPERTENSION: Primary | ICD-10-CM

## 2023-09-07 DIAGNOSIS — Z12.5 SCREENING FOR PROSTATE CANCER: ICD-10-CM

## 2023-09-07 DIAGNOSIS — F17.210 HEAVY CIGARETTE SMOKER: ICD-10-CM

## 2023-09-07 PROCEDURE — 3079F PR MOST RECENT DIASTOLIC BLOOD PRESSURE 80-89 MM HG: ICD-10-PCS | Mod: CPTII,,, | Performed by: NURSE PRACTITIONER

## 2023-09-07 PROCEDURE — 3079F DIAST BP 80-89 MM HG: CPT | Mod: CPTII,,, | Performed by: NURSE PRACTITIONER

## 2023-09-07 PROCEDURE — 1159F MED LIST DOCD IN RCRD: CPT | Mod: CPTII,,, | Performed by: NURSE PRACTITIONER

## 2023-09-07 PROCEDURE — 3075F SYST BP GE 130 - 139MM HG: CPT | Mod: CPTII,,, | Performed by: NURSE PRACTITIONER

## 2023-09-07 PROCEDURE — 1159F PR MEDICATION LIST DOCUMENTED IN MEDICAL RECORD: ICD-10-PCS | Mod: CPTII,,, | Performed by: NURSE PRACTITIONER

## 2023-09-07 PROCEDURE — 99213 PR OFFICE/OUTPT VISIT, EST, LEVL III, 20-29 MIN: ICD-10-PCS | Mod: S$PBB,,, | Performed by: NURSE PRACTITIONER

## 2023-09-07 PROCEDURE — 1160F RVW MEDS BY RX/DR IN RCRD: CPT | Mod: CPTII,,, | Performed by: NURSE PRACTITIONER

## 2023-09-07 PROCEDURE — 3075F PR MOST RECENT SYSTOLIC BLOOD PRESS GE 130-139MM HG: ICD-10-PCS | Mod: CPTII,,, | Performed by: NURSE PRACTITIONER

## 2023-09-07 PROCEDURE — 3008F PR BODY MASS INDEX (BMI) DOCUMENTED: ICD-10-PCS | Mod: CPTII,,, | Performed by: NURSE PRACTITIONER

## 2023-09-07 PROCEDURE — 3008F BODY MASS INDEX DOCD: CPT | Mod: CPTII,,, | Performed by: NURSE PRACTITIONER

## 2023-09-07 PROCEDURE — 99213 OFFICE O/P EST LOW 20 MIN: CPT | Mod: S$PBB,,, | Performed by: NURSE PRACTITIONER

## 2023-09-07 PROCEDURE — 99214 OFFICE O/P EST MOD 30 MIN: CPT | Performed by: NURSE PRACTITIONER

## 2023-09-07 PROCEDURE — 4010F ACE/ARB THERAPY RXD/TAKEN: CPT | Mod: CPTII,,, | Performed by: NURSE PRACTITIONER

## 2023-09-07 PROCEDURE — 1160F PR REVIEW ALL MEDS BY PRESCRIBER/CLIN PHARMACIST DOCUMENTED: ICD-10-PCS | Mod: CPTII,,, | Performed by: NURSE PRACTITIONER

## 2023-09-07 PROCEDURE — 4010F PR ACE/ARB THEARPY RXD/TAKEN: ICD-10-PCS | Mod: CPTII,,, | Performed by: NURSE PRACTITIONER

## 2023-09-07 NOTE — PROGRESS NOTES
SUBJECTIVE:      Patient ID: Wilmer Lange Jr is a 58 y.o. male.    Chief Complaint: Hypertension    Wilmer is here for follow up on HTN today. Taking meds Metoprolol and Benicar as prescribed without SE or complaints-BP is below goal and he is feeling well. No high readings at home. Still smoking cigarettes- not ready to quit. Due for annual labs in 12/23. Has appt upcoming with Dr. Patton for pancreatic cyst and colon cancer screening. Denies abd pain- not drinking alcohol.     Outstanding HM reviewed with pt today- plans on flu shot and COVID booster in 10/23    Hypertension  This is a chronic problem. The current episode started more than 1 year ago. The problem has been gradually improving since onset. The problem is controlled. Pertinent negatives include no anxiety, blurred vision, chest pain, headaches, malaise/fatigue, palpitations, peripheral edema, shortness of breath or sweats. Risk factors for coronary artery disease include smoking/tobacco exposure, stress, male gender and family history. Past treatments include beta blockers and angiotensin blockers. The current treatment provides moderate improvement. Compliance problems include exercise and diet.  There is no history of angina, kidney disease, CAD/MI, CVA or heart failure. There is no history of sleep apnea or a thyroid problem.       Family History   Problem Relation Age of Onset    Dementia Mother     Hypertension Mother     Diabetes Father     Hypertension Father     Aneurysm Father     No Known Problems Sister     No Known Problems Brother     No Known Problems Daughter     Retinal detachment Son     Blindness Son     No Known Problems Son     No Known Problems Son       Social History     Socioeconomic History    Marital status:     Number of children: 3   Occupational History    Occupation: electrical & yard work   Tobacco Use    Smoking status: Every Day     Current packs/day: 2.00     Average packs/day: 2.0 packs/day for 37.0  years (74.0 ttl pk-yrs)     Types: Cigarettes    Smokeless tobacco: Never   Substance and Sexual Activity    Alcohol use: Yes     Alcohol/week: 21.0 standard drinks of alcohol     Types: 21 Cans of beer per week    Drug use: Never    Sexual activity: Yes     Partners: Female     Birth control/protection: None   Social History Narrative    ** Merged History Encounter **          Social Determinants of Health     Stress: No Stress Concern Present (3/5/2020)    [employer] - Occupational Stress Questionnaire     Feeling of Stress : Not at all     Current Outpatient Medications   Medication Sig Dispense Refill    metoprolol succinate (TOPROL-XL) 25 MG 24 hr tablet Take 1 tablet (25 mg total) by mouth once daily. 90 tablet 1    olmesartan (BENICAR) 20 MG tablet Take 1 tablet (20 mg total) by mouth once daily. 90 tablet 1     No current facility-administered medications for this visit.     Review of patient's allergies indicates:  No Known Allergies   Past Medical History:   Diagnosis Date    Hypertension      History reviewed. No pertinent surgical history.    Review of Systems   Constitutional:  Negative for activity change, appetite change, chills, diaphoresis, fatigue, fever, malaise/fatigue and unexpected weight change.   HENT:  Positive for hearing loss. Negative for congestion, ear pain, rhinorrhea, sinus pressure, sore throat, trouble swallowing and voice change.    Eyes:  Negative for blurred vision, pain and discharge.   Respiratory:  Negative for cough, chest tightness, shortness of breath and wheezing.    Cardiovascular:  Negative for chest pain, palpitations and leg swelling.   Gastrointestinal:  Negative for abdominal pain, blood in stool, constipation, diarrhea, nausea and vomiting.   Endocrine: Negative for cold intolerance, heat intolerance, polydipsia and polyuria.   Genitourinary:  Negative for difficulty urinating, flank pain, frequency, hematuria and urgency.   Skin:   "Negative for color change and rash.   Neurological:  Negative for dizziness, seizures, syncope, weakness, numbness and headaches.   Hematological:  Negative for adenopathy. Does not bruise/bleed easily.   Psychiatric/Behavioral:  Negative for confusion, dysphoric mood and sleep disturbance. The patient is not nervous/anxious.       OBJECTIVE:      Vitals:    09/07/23 0955   BP: 130/82   BP Location: Left arm   Patient Position: Sitting   BP Method: Medium (Manual)   Pulse: 88   Resp: 20   Temp: 98.7 °F (37.1 °C)   TempSrc: Oral   SpO2: 98%   Weight: 81.6 kg (179 lb 12.8 oz)   Height: 5' 10" (1.778 m)     Physical Exam  Vitals and nursing note reviewed. Exam conducted with a chaperone present.   Constitutional:       General: He is not in acute distress.     Appearance: Normal appearance. He is well-developed and normal weight. He is not ill-appearing.   HENT:      Head: Normocephalic.      Nose: Nose normal.      Mouth/Throat:      Mouth: Mucous membranes are moist.   Eyes:      General: No scleral icterus.     Pupils: Pupils are equal, round, and reactive to light.   Neck:      Thyroid: No thyroid mass or thyromegaly.      Trachea: Trachea normal.   Cardiovascular:      Rate and Rhythm: Normal rate and regular rhythm.      Heart sounds: Normal heart sounds. No murmur heard.  Pulmonary:      Effort: Pulmonary effort is normal. No respiratory distress.      Breath sounds: Normal breath sounds. No wheezing or rales.   Musculoskeletal:         General: Normal range of motion.      Cervical back: Normal range of motion and neck supple.      Right lower leg: No edema.      Left lower leg: No edema.   Lymphadenopathy:      Cervical: No cervical adenopathy.   Skin:     General: Skin is warm and dry.      Coloration: Skin is not jaundiced or pale.   Neurological:      Mental Status: He is alert and oriented to person, place, and time.   Psychiatric:         Mood and Affect: Mood normal.         Behavior: Behavior normal.    "      Thought Content: Thought content normal.         Judgment: Judgment normal.        Assessment:       1. Essential hypertension    2. Heavy cigarette smoker    3. Screening for prostate cancer        Plan:       Essential hypertension  -     CBC Auto Differential; Future; Expected date: 12/04/2023  -     Comprehensive Metabolic Panel; Future; Expected date: 12/04/2023  -     Lipid Panel; Future; Expected date: 12/04/2023  -     TSH; Future; Expected date: 12/04/2023  -     Urinalysis; Future; Expected date: 12/04/2023  -stable/below goal; continue medication daily; labs in 3 mo    Heavy cigarette smoker  -     CT Chest Lung Screening Low Dose; Future; Expected date: 12/07/2023  -annual screening due in 12/23; advised quit smoking    Screening for prostate cancer  -     PSA, Screening; Future; Expected date: 12/04/2023        Follow up in about 3 months (around 12/7/2023) for annual .      9/7/2023 RONNIE Espinal, FNP    This note was created using [employer] voice recognition software that occasionally misinterprets phrases or words.

## 2023-09-18 ENCOUNTER — PATIENT MESSAGE (OUTPATIENT)
Dept: FAMILY MEDICINE | Facility: CLINIC | Age: 58
End: 2023-09-18

## 2023-11-17 DIAGNOSIS — R00.0 TACHYCARDIA: ICD-10-CM

## 2023-11-17 DIAGNOSIS — I10 ESSENTIAL HYPERTENSION: ICD-10-CM

## 2023-11-17 RX ORDER — METOPROLOL SUCCINATE 25 MG/1
25 TABLET, EXTENDED RELEASE ORAL
Qty: 90 TABLET | Refills: 1 | Status: SHIPPED | OUTPATIENT
Start: 2023-11-17

## 2023-12-08 ENCOUNTER — TELEPHONE (OUTPATIENT)
Dept: FAMILY MEDICINE | Facility: CLINIC | Age: 58
End: 2023-12-08

## 2024-02-28 ENCOUNTER — TELEPHONE (OUTPATIENT)
Dept: FAMILY MEDICINE | Facility: CLINIC | Age: 59
End: 2024-02-28
Payer: MEDICAID

## 2024-03-12 ENCOUNTER — PATIENT MESSAGE (OUTPATIENT)
Dept: ADMINISTRATIVE | Facility: HOSPITAL | Age: 59
End: 2024-03-12
Payer: MEDICAID

## 2024-08-02 ENCOUNTER — PATIENT MESSAGE (OUTPATIENT)
Dept: ADMINISTRATIVE | Facility: HOSPITAL | Age: 59
End: 2024-08-02
Payer: MEDICAID

## 2024-11-29 ENCOUNTER — OFFICE VISIT (OUTPATIENT)
Dept: FAMILY MEDICINE | Facility: CLINIC | Age: 59
End: 2024-11-29

## 2024-11-29 VITALS
DIASTOLIC BLOOD PRESSURE: 66 MMHG | SYSTOLIC BLOOD PRESSURE: 118 MMHG | TEMPERATURE: 98 F | HEART RATE: 82 BPM | BODY MASS INDEX: 24.94 KG/M2 | HEIGHT: 70 IN | WEIGHT: 174.19 LBS | OXYGEN SATURATION: 96 %

## 2024-11-29 DIAGNOSIS — I10 ESSENTIAL HYPERTENSION: Primary | ICD-10-CM

## 2024-11-29 DIAGNOSIS — Z12.5 SCREENING FOR PROSTATE CANCER: ICD-10-CM

## 2024-11-29 DIAGNOSIS — Z12.11 SCREEN FOR COLON CANCER: ICD-10-CM

## 2024-11-29 DIAGNOSIS — F17.210 HEAVY CIGARETTE SMOKER: ICD-10-CM

## 2024-11-29 DIAGNOSIS — Z23 NEED FOR INFLUENZA VACCINATION: ICD-10-CM

## 2024-11-29 PROBLEM — H60.502 ACUTE OTITIS EXTERNA OF LEFT EAR: Status: RESOLVED | Noted: 2021-05-07 | Resolved: 2024-11-29

## 2024-11-29 PROBLEM — R53.83 FATIGUE: Status: RESOLVED | Noted: 2021-04-01 | Resolved: 2024-11-29

## 2024-11-29 PROCEDURE — 99999 PR PBB SHADOW E&M-EST. PATIENT-LVL IV: CPT | Mod: PBBFAC,,, | Performed by: NURSE PRACTITIONER

## 2024-11-29 NOTE — PROGRESS NOTES
SUBJECTIVE:      Patient ID: Wilmer Lange Jr is a 59 y.o. male.    Chief Complaint: Annual Exam    History of Present Illness    CHIEF COMPLAINT:  Wilmer presents for a follow-up visit to discuss blood pressure management and overall health status after discontinuing alcohol use 13 months ago.    HPI:  Wilmer discontinued alcohol consumption 13 months ago and subsequently stopped blood pressure medication due to consistently normal readings. He occasionally has sensations suggesting a need for blood pressure medication in the mornings. His blood pressure today is 118/66, within normal range, and he reports excellent/normal heart rate.    Wilmer continues to smoke cigarettes, describing it as the only habit he has not quit. He has no current thoughts about consuming alcohol. The initial months after quitting were challenging, but now he feels it suits him well.    A CT for his lungs was previously done- due for repeat wellness CT.     iWlmer denies current hypertension, headaches, night sweats, cough, and shortness of breath episodes.    Due for annual labs and colon cancer screening at this time.  Patient denies any family history of colon cancer, changes in bowel habits or dark or bloody stools.  Patient denies ever seeing Gastroenterology after pancreatic cyst diagnosed over a year ago.    MEDICAL HISTORY:  Wilmer has a history of hypertension, which resolved after discontinuing alcohol use 13 months ago. He also has a history of alcohol use disorder, which he discontinued 13 months ago.    FAMILY HISTORY:  Family history is significant for brother who recently had a stent placed (CAD)    IMAGING:  Wilmer has completed a CT of the lungs in the past.    SOCIAL HISTORY:  Wilmer discontinued alcohol use 13 months ago. He is currently a smoker.          Family History   Problem Relation Name Age of Onset    Dementia Mother      Hypertension Mother      Diabetes Father      Hypertension Father      Aneurysm Father       No Known Problems Sister      No Known Problems Brother      No Known Problems Daughter      Retinal detachment Son      Blindness Son      No Known Problems Son      No Known Problems Son        Social History     Socioeconomic History    Marital status:     Number of children: 3   Occupational History    Occupation: electrical & yard work   Tobacco Use    Smoking status: Every Day     Current packs/day: 2.00     Average packs/day: 2.0 packs/day for 37.0 years (74.0 ttl pk-yrs)     Types: Cigarettes    Smokeless tobacco: Never   Substance and Sexual Activity    Alcohol use: Yes     Alcohol/week: 21.0 standard drinks of alcohol     Types: 21 Cans of beer per week    Drug use: Never    Sexual activity: Yes     Partners: Female     Birth control/protection: None   Social History Narrative    ** Merged History Encounter **          Social Drivers of Health     Financial Resource Strain: High Risk (11/22/2024)    Overall Financial Resource Strain (CARDIA)     Difficulty of Paying Living Expenses: Hard   Food Insecurity: Food Insecurity Present (11/22/2024)    Hunger Vital Sign     Worried About Running Out of Food in the Last Year: Sometimes true     Ran Out of Food in the Last Year: Sometimes true   Physical Activity: Inactive (11/22/2024)    Exercise Vital Sign     Days of Exercise per Week: 2 days     Minutes of Exercise per Session: 0 min   Stress: Stress Concern Present (11/22/2024)    [employer] - Occupational Stress Questionnaire     Feeling of Stress : To some extent   Housing Stability: Unknown (11/22/2024)    Housing Stability Vital Sign     Unable to Pay for Housing in the Last Year: No     No current outpatient medications on file.     No current facility-administered medications for this visit.     Review of patient's allergies indicates:  No Known Allergies   Past Medical History:   Diagnosis Date    Hypertension      History reviewed. No pertinent surgical  "history.    Review of Systems   Constitutional:  Negative for activity change, appetite change, chills, diaphoresis, fatigue, fever and unexpected weight change.   HENT:  Negative for congestion and trouble swallowing.    Eyes:  Negative for pain and discharge.   Respiratory:  Negative for cough, chest tightness, shortness of breath and wheezing.    Cardiovascular:  Negative for chest pain, palpitations and leg swelling.   Gastrointestinal:  Negative for abdominal pain, blood in stool, constipation, diarrhea, nausea and vomiting.   Endocrine: Negative for cold intolerance, heat intolerance, polydipsia and polyuria.   Genitourinary:  Negative for difficulty urinating, frequency and hematuria.   Skin:  Negative for color change and rash.   Neurological:  Negative for dizziness, seizures, syncope, weakness, numbness and headaches.   Hematological:  Negative for adenopathy. Does not bruise/bleed easily.   Psychiatric/Behavioral:  Negative for confusion, dysphoric mood and sleep disturbance. The patient is not nervous/anxious.       OBJECTIVE:      Vitals:    11/29/24 0757   BP: 118/66   BP Location: Left arm   Pulse: 82   Temp: 98.3 °F (36.8 °C)   TempSrc: Oral   SpO2: 96%   Weight: 79 kg (174 lb 2.6 oz)   Height: 5' 10" (1.778 m)     Physical Exam  Vitals and nursing note reviewed. Exam conducted with a chaperone present.   Constitutional:       General: He is not in acute distress.     Appearance: Normal appearance. He is well-developed and normal weight. He is not ill-appearing.   HENT:      Head: Normocephalic.      Mouth/Throat:      Mouth: Mucous membranes are moist.   Eyes:      General: No scleral icterus.     Pupils: Pupils are equal, round, and reactive to light.   Neck:      Thyroid: No thyroid mass or thyromegaly.      Trachea: Trachea normal.   Cardiovascular:      Rate and Rhythm: Normal rate and regular rhythm.      Heart sounds: Normal heart sounds. No murmur heard.  Pulmonary:      Effort: Pulmonary " effort is normal. No respiratory distress.      Breath sounds: Normal breath sounds. No wheezing or rales.   Musculoskeletal:         General: Normal range of motion.      Cervical back: Normal range of motion and neck supple.      Right lower leg: No edema.      Left lower leg: No edema.   Lymphadenopathy:      Cervical: No cervical adenopathy.   Skin:     General: Skin is warm and dry.      Coloration: Skin is not jaundiced or pale.   Neurological:      Mental Status: He is alert and oriented to person, place, and time.   Psychiatric:         Mood and Affect: Mood normal.         Behavior: Behavior normal.         Thought Content: Thought content normal.         Judgment: Judgment normal.        Assessment:       1. Essential hypertension    2. Screen for colon cancer    3. Screening for prostate cancer    4. Heavy cigarette smoker    5. Need for influenza vaccination    6. BMI 24.0-24.9, adult        Plan:      Assessment & Plan    HYPERTENSION:  - Explained rationale for blood pressure medication and potential risks of untreated hypertension.  - Discussed importance of maintaining stable blood pressure   - Pt Discontinued blood pressure medication due to well-controlled readings without medication.  - Ordered urinalysis to check for protein   - Follow up if blood pressure appears to be elevated.    ALCOHOL ABUSE:  - Educated on the benefits of alcohol cessation for overall health.    NICOTINE DEPENDENCE:  - Informed about the impact of smoking.  - Wilmer to work on reducing smoking, with the goal of cessation.    LUNG CANCER SCREENING:  - Ordered CT of lungs.    COLORECTAL CANCER SCREENING:  - Ordered Cologuard test for colorectal cancer screening.  - Follow up in 3 years for repeat Cologuard or consider colonoscopy if Cologuard is negative.    PROSTATE CANCER SCREENING:  - Ordered PSA blood test to check prostate.    IMMUNIZATION:  - Administered flu vaccine in office.         Essential hypertension  -     CBC  Auto Differential; Future; Expected date: 11/29/2024  -     Comprehensive Metabolic Panel; Future; Expected date: 11/29/2024  -     Lipid Panel; Future; Expected date: 11/29/2024  -     TSH; Future; Expected date: 11/29/2024  -     Urinalysis; Future; Expected date: 11/29/2024   -Normal BP off meds; check labs; monitor BP    Screen for colon cancer  -     Cologuard Screening (Multitarget Stool DNA); Future; Expected date: 11/29/2024    Screening for prostate cancer  -     PSA, Screening; Future; Expected date: 11/29/2024    Heavy cigarette smoker  -     CT Chest Lung Screening Low Dose; Future; Expected date: 11/29/2024    Need for influenza vaccination  -     influenza (Flulaval, Fluzone, Fluarix) 45 mcg/0.5 mL IM vaccine (> or = 6 mo) 0.5 mL    BMI 24.0-24.9, adult        Follow up in about 6 months (around 5/29/2025) for f/u HTN .      11/29/2024 Chani Lares, RONNIE, FNP  This note was generated with the assistance of ambient listening technology. Verbal consent was obtained by the patient and accompanying visitor(s) for the recording of patient appointment to facilitate this note. I attest to having reviewed and edited the generated note for accuracy, though some syntax or spelling errors may persist. Please contact the author of this note for any clarification.     This note was created using [employer] voice recognition software that occasionally misinterprets phrases or words.